# Patient Record
Sex: FEMALE | Race: WHITE | NOT HISPANIC OR LATINO | Employment: PART TIME | URBAN - METROPOLITAN AREA
[De-identification: names, ages, dates, MRNs, and addresses within clinical notes are randomized per-mention and may not be internally consistent; named-entity substitution may affect disease eponyms.]

---

## 2021-01-11 ENCOUNTER — TRANSCRIBE ORDERS (OUTPATIENT)
Dept: ADMINISTRATIVE | Facility: HOSPITAL | Age: 61
End: 2021-01-11

## 2021-01-25 ENCOUNTER — TRANSCRIBE ORDERS (OUTPATIENT)
Dept: ADMINISTRATIVE | Facility: HOSPITAL | Age: 61
End: 2021-01-25

## 2021-01-25 DIAGNOSIS — Z12.31 VISIT FOR SCREENING MAMMOGRAM: Primary | ICD-10-CM

## 2021-03-24 ENCOUNTER — APPOINTMENT (OUTPATIENT)
Dept: MAMMOGRAPHY | Facility: HOSPITAL | Age: 61
End: 2021-03-24

## 2021-05-26 ENCOUNTER — HOSPITAL ENCOUNTER (OUTPATIENT)
Dept: MAMMOGRAPHY | Facility: HOSPITAL | Age: 61
Discharge: HOME | End: 2021-05-26
Admitting: OBSTETRICS & GYNECOLOGY

## 2021-05-26 DIAGNOSIS — Z12.31 VISIT FOR SCREENING MAMMOGRAM: ICD-10-CM

## 2021-05-26 PROCEDURE — 77067 SCR MAMMO BI INCL CAD: CPT | Performed by: RADIOLOGY

## 2021-05-26 PROCEDURE — 77063 BREAST TOMOSYNTHESIS BI: CPT

## 2021-05-26 PROCEDURE — 77067 SCR MAMMO BI INCL CAD: CPT

## 2021-05-26 PROCEDURE — 77063 BREAST TOMOSYNTHESIS BI: CPT | Performed by: RADIOLOGY

## 2021-06-23 ENCOUNTER — TRANSCRIBE ORDERS (OUTPATIENT)
Dept: MAMMOGRAPHY | Facility: HOSPITAL | Age: 61
End: 2021-06-23

## 2021-06-23 ENCOUNTER — HOSPITAL ENCOUNTER (OUTPATIENT)
Dept: MAMMOGRAPHY | Facility: HOSPITAL | Age: 61
Discharge: HOME | End: 2021-06-23
Admitting: RADIOLOGY

## 2021-06-23 DIAGNOSIS — R92.8 ABNORMAL MAMMOGRAM: Primary | ICD-10-CM

## 2021-06-23 DIAGNOSIS — R92.8 ABNORMAL MAMMOGRAM: ICD-10-CM

## 2021-06-23 PROCEDURE — 77065 DX MAMMO INCL CAD UNI: CPT

## 2021-06-23 PROCEDURE — 77065 DX MAMMO INCL CAD UNI: CPT | Performed by: RADIOLOGY

## 2021-07-28 ENCOUNTER — HOSPITAL ENCOUNTER (OUTPATIENT)
Dept: MAMMOGRAPHY | Facility: HOSPITAL | Age: 61
Discharge: HOME | End: 2021-07-28

## 2021-07-28 DIAGNOSIS — R92.8 ABNORMAL MAMMOGRAM: ICD-10-CM

## 2021-07-28 PROCEDURE — 76098 X-RAY EXAM SURGICAL SPECIMEN: CPT

## 2021-07-28 PROCEDURE — A4648 IMPLANTABLE TISSUE MARKER: HCPCS

## 2021-07-28 PROCEDURE — 88305 TISSUE EXAM BY PATHOLOGIST: CPT | Performed by: OBSTETRICS & GYNECOLOGY

## 2021-07-28 PROCEDURE — 25010000003 LIDOCAINE 1 % SOLUTION: Performed by: RADIOLOGY

## 2021-07-28 PROCEDURE — 77065 DX MAMMO INCL CAD UNI: CPT | Performed by: RADIOLOGY

## 2021-07-28 PROCEDURE — 19081 BX BREAST 1ST LESION STRTCTC: CPT | Performed by: RADIOLOGY

## 2021-07-28 RX ORDER — LIDOCAINE HYDROCHLORIDE 10 MG/ML
5 INJECTION, SOLUTION INFILTRATION; PERINEURAL ONCE
Status: COMPLETED | OUTPATIENT
Start: 2021-07-28 | End: 2021-07-28

## 2021-07-28 RX ORDER — LIDOCAINE HYDROCHLORIDE AND EPINEPHRINE 10; 10 MG/ML; UG/ML
10 INJECTION, SOLUTION INFILTRATION; PERINEURAL ONCE
Status: COMPLETED | OUTPATIENT
Start: 2021-07-28 | End: 2021-07-28

## 2021-07-28 RX ADMIN — LIDOCAINE HYDROCHLORIDE 0.5 ML: 10 INJECTION, SOLUTION INFILTRATION; PERINEURAL at 11:29

## 2021-07-28 RX ADMIN — LIDOCAINE HYDROCHLORIDE,EPINEPHRINE BITARTRATE 5 ML: 10; .01 INJECTION, SOLUTION INFILTRATION; PERINEURAL at 11:34

## 2021-07-28 NOTE — PROGRESS NOTES
Alert and orientated. Denies discomfort., No active bleeding., Steri-strips not visualized, gauze dressing intact. Ice packs given. Verbalizes and demonstrates understanding of post-care instructions, written copy given.

## 2021-07-29 LAB
CYTO UR: NORMAL
LAB AP CASE REPORT: NORMAL
LAB AP CLINICAL INFORMATION: NORMAL
LAB AP DIAGNOSIS COMMENT: NORMAL
PATH REPORT.FINAL DX SPEC: NORMAL
PATH REPORT.GROSS SPEC: NORMAL

## 2022-01-11 ENCOUNTER — TRANSCRIBE ORDERS (OUTPATIENT)
Dept: MAMMOGRAPHY | Facility: HOSPITAL | Age: 62
End: 2022-01-11

## 2022-01-11 DIAGNOSIS — R92.8 ABNORMAL MAMMOGRAM: Primary | ICD-10-CM

## 2022-03-01 ENCOUNTER — TRANSCRIBE ORDERS (OUTPATIENT)
Dept: MAMMOGRAPHY | Facility: HOSPITAL | Age: 62
End: 2022-03-01

## 2022-03-01 ENCOUNTER — HOSPITAL ENCOUNTER (OUTPATIENT)
Dept: MAMMOGRAPHY | Facility: HOSPITAL | Age: 62
Discharge: HOME | End: 2022-03-01
Admitting: OBSTETRICS & GYNECOLOGY

## 2022-03-01 DIAGNOSIS — R92.8 ABNORMAL MAMMOGRAM: ICD-10-CM

## 2022-03-01 DIAGNOSIS — R92.8 ABNORMAL MAMMOGRAM: Primary | ICD-10-CM

## 2022-03-01 PROCEDURE — 77065 DX MAMMO INCL CAD UNI: CPT | Performed by: RADIOLOGY

## 2022-03-01 PROCEDURE — 77065 DX MAMMO INCL CAD UNI: CPT

## 2023-11-27 ENCOUNTER — OFFICE VISIT (OUTPATIENT)
Dept: ENDOCRINOLOGY | Facility: CLINIC | Age: 63
End: 2023-11-27
Payer: COMMERCIAL

## 2023-11-27 VITALS
HEIGHT: 67 IN | BODY MASS INDEX: 28.25 KG/M2 | DIASTOLIC BLOOD PRESSURE: 76 MMHG | HEART RATE: 54 BPM | WEIGHT: 180 LBS | OXYGEN SATURATION: 98 % | SYSTOLIC BLOOD PRESSURE: 130 MMHG

## 2023-11-27 DIAGNOSIS — E03.8 HYPOTHYROIDISM DUE TO HASHIMOTO'S THYROIDITIS: Primary | ICD-10-CM

## 2023-11-27 DIAGNOSIS — G47.00 INSOMNIA, UNSPECIFIED TYPE: ICD-10-CM

## 2023-11-27 DIAGNOSIS — E06.3 HYPOTHYROIDISM DUE TO HASHIMOTO'S THYROIDITIS: Primary | ICD-10-CM

## 2023-11-27 DIAGNOSIS — R00.1 BRADYCARDIA: ICD-10-CM

## 2023-11-27 LAB
T4 FREE SERPL-MCNC: 1.17 NG/DL (ref 0.93–1.7)
TSH SERPL DL<=0.05 MIU/L-ACNC: 2.14 UIU/ML (ref 0.27–4.2)

## 2023-11-27 PROCEDURE — 84443 ASSAY THYROID STIM HORMONE: CPT | Performed by: INTERNAL MEDICINE

## 2023-11-27 PROCEDURE — 84439 ASSAY OF FREE THYROXINE: CPT | Performed by: INTERNAL MEDICINE

## 2023-11-27 PROCEDURE — 99204 OFFICE O/P NEW MOD 45 MIN: CPT | Performed by: INTERNAL MEDICINE

## 2023-11-27 RX ORDER — LEVOTHYROXINE SODIUM 88 UG/1
88 TABLET ORAL DAILY
Qty: 30 TABLET | Refills: 5 | Status: SHIPPED | OUTPATIENT
Start: 2023-11-27

## 2023-11-27 NOTE — PATIENT INSTRUCTIONS
Try melatonin 3-5 mg 30 min to an hour before bed OR unisom 1/4-whole tab 30-60 minutes before bed.     If you have episodes of snoring, you may need to undergo a sleep evaluation.

## 2023-11-27 NOTE — PROGRESS NOTES
Chief Complaint   Patient presents with    Hypothyroidism        Referring Provider  Lorena Camp PA     HPI   Otiliaannie Cano is a 62 y.o. female had concerns including Hypothyroidism.     Hypothyroidism was diagnosed maybe around .     She feels fatigued, hair loss, dry skin. Other times she has insomnia and worries it could be thyroid related.     Is currently on levothyroxine 75 mcg daily. Takes daily as directed separate from food/drink/meds and no missed doses.   In the past has been on cytomel. Saw an endocrinologist in the past who wanted her off of it.     Has issues with bradycardia - was told maybe due to B12 deficiency.   At times as low as 33. Saw cardiology and had a heart monitor but didn't have an episode while wearing the monitor.     Sleep is poor - gets at most maybe 6 hrs at night. Sometimes less.   Falls asleep ok but wakes overnight, sometimes can't go back to sleep.   Hasn't tried anything OTC or sleep aids.      Past Medical History:   Diagnosis Date    H/O Hashimoto thyroiditis     Hx of echocardiogram 2015    ejection fraction of 60% to 65% with normal diastolic filling    Hyperlipidemia     Palpitations     Syncope     Neg tilt table study 3/2/16, echo 11/15 normal EF 60-65%     Past Surgical History:   Procedure Laterality Date    BREAST BIOPSY Left      SECTION      2    HYSTERECTOMY      OOPHORECTOMY        Family History   Problem Relation Age of Onset    Heart attack Other     Breast cancer Maternal Aunt       Social History     Socioeconomic History    Marital status:    Tobacco Use    Smoking status: Former     Types: Cigarettes     Quit date:      Years since quittin.9   Substance and Sexual Activity    Alcohol use: Yes     Alcohol/week: 1.0 standard drink of alcohol     Types: 1 Glasses of wine per week     Comment: occ    Sexual activity: Defer      Allergies   Allergen Reactions    Codeine Nausea And Vomiting    Rosuvastatin Myalgia  "     Current Outpatient Medications on File Prior to Visit   Medication Sig Dispense Refill    Cholecalciferol (VITAMIN D3) 2000 UNITS tablet Take  by mouth.      estradiol (MINIVELLE) 0.05 MG/24HR patch Place 1 patch on the skin as directed by provider 1 (One) Time Per Week.      levothyroxine (SYNTHROID, LEVOTHROID) 75 MCG tablet Take 1 tablet by mouth Daily.      [DISCONTINUED] aspirin 81 MG tablet Take 81 mg by mouth daily.      [DISCONTINUED] FOLIC ACID PO Take  by mouth.      [DISCONTINUED] liothyronine (CYTOMEL) 5 MCG tablet Take 5 mcg by mouth daily.      [DISCONTINUED] lisinopril-hydrochlorothiazide (PRINZIDE) 10-12.5 MG per tablet 0.5 tablet daily 30 tablet 2    [DISCONTINUED] lisinopril-hydrochlorothiazide (PRINZIDE,ZESTORETIC) 10-12.5 MG per tablet TAKE 1 TABLET BY ORAL ROUTE ONCE DAILY 30 tablet 0     No current facility-administered medications on file prior to visit.        Review of Systems   Constitutional:  Positive for fatigue.   HENT:  Positive for tinnitus.    Eyes: Negative.         Dryness   Respiratory: Negative.     Cardiovascular: Negative.    Gastrointestinal:  Positive for constipation.   Endocrine: Positive for cold intolerance and heat intolerance.        Hair loss   Genitourinary: Negative.    Musculoskeletal: Negative.    Skin: Negative.    Allergic/Immunologic: Negative.    Neurological: Negative.    Hematological: Negative.    Psychiatric/Behavioral:  Positive for sleep disturbance.         /76   Pulse 54   Ht 170.2 cm (67\")   Wt 81.6 kg (180 lb)   LMP  (LMP Unknown)   SpO2 98%   BMI 28.19 kg/m²      Physical Exam    Constitutional:  well developed; well nourished  no acute distress   ENT/Thyroid: no thyromegaly  no palpable nodules   Eyes: EOM intact  Conjunctiva: clear   Respiratory:  breathing is unlabored  clear to auscultation bilaterally   Cardiovascular:  regular rate and rhythm, S1, S2 normal, no murmur, click, rub or gallop   Chest:  Not performed.   Abdomen: " Not performed.   : Not performed.   Musculoskeletal: negative findings:  ROM of all joints is normal, no deformities present   Skin: dry and warm   Neuro: normal without focal findings and mental status, speech normal, alert and oriented x3   Psych: oriented to time, place and person, mood and affect are within normal limits     Labs/Imaging  CMP  Lab Results   Component Value Date    GLUCOSE 96 01/20/2016    BUN 14 01/20/2016    CREATININE 0.8 01/20/2016    EGFR 77 11/09/2015    K 4.6 01/20/2016    CO2 28 01/20/2016    CALCIUM 9.6 01/20/2016    CALCIUM 9.3 01/20/2016    ALBUMIN 5.0 (H) 11/09/2015    AST 24 11/09/2015    ALT 27 11/09/2015        CBC w/DIFF   Lab Results   Component Value Date    WBC 7.63 11/09/2015    RBC 4.77 11/09/2015    HGB 14.5 11/09/2015    HCT 42.9 11/09/2015    MCV 89.9 11/09/2015    MCH 30.4 11/09/2015    MCHC 33.8 11/09/2015     11/09/2015    NEUTRORELPCT 61.6 11/09/2015    LYMPHORELPCT 25.7 11/09/2015    MONORELPCT 10.2 11/09/2015    EOSRELPCT 1.7 11/09/2015    BASORELPCT 0.7 11/09/2015    NEUTROABS 4.70 11/09/2015    LYMPHSABS 1.96 11/09/2015    MONOSABS 0.78 11/09/2015    EOSABS 0.13 11/09/2015    BASOSABS 0.05 11/09/2015     TSH  Lab Results   Component Value Date    TSH 0.849 01/20/2016    TSH 4.017 11/09/2015     T4  Lab Results   Component Value Date    FREET4 0.94 01/20/2016     T3  Lab Results   Component Value Date    T3FREE 3.1 01/20/2016 6/21/2023 vitamin D 36, B12 214 with lower limit 232, folic acid 8.3, normal, free T3 2.22, normal, free T4 1.27, TSH 2.42, CBC within normal limits, CMP with glucose 104, creatinine 0.88, LFTs normal, total cholesterol 250, , triglycerides 124, HDL 69, TPO antibody 27, thyroglobulin 2      Assessment and Plan    Diagnoses and all orders for this visit:    1. Hypothyroidism due to Hashimoto's thyroiditis (Primary)  Diagnosed 2008.    With persistent symptoms.  On levothyroxine 75 mcg daily, takes as directed with no missed  doses.  TSH mid normal.  Has been on Cytomel in the past in addition to T4 but no significant change in symptoms.    Repeat TFTs today and titrate levothyroxine if able for TSH in the lower range of normal.  -     TSH  -     T4, Free    2. Insomnia, unspecified type  Uncontrolled, some snoring, consider possible evaluation for sleep apnea.  Recommended OTC melatonin and/or Unisom.  Follow-up with PCP if no change.    3. Bradycardia  Seems unrelated to the thyroid with levels in normal range, but will work to optimize TSH as above.  Follow-up with cardiology if symptoms persist or progress.       Return in about 5 months (around 4/27/2024) for next scheduled follow up. The patient was instructed to contact the clinic with any interval questions or concerns.    Daly Calle,    Endocrinologist    Please note that portions of this note were completed with a voice recognition program.

## 2024-04-06 DIAGNOSIS — E03.8 HYPOTHYROIDISM DUE TO HASHIMOTO'S THYROIDITIS: ICD-10-CM

## 2024-04-06 DIAGNOSIS — E06.3 HYPOTHYROIDISM DUE TO HASHIMOTO'S THYROIDITIS: ICD-10-CM

## 2024-04-09 NOTE — TELEPHONE ENCOUNTER
Rx Refill Note  Requested Prescriptions     Pending Prescriptions Disp Refills    levothyroxine (SYNTHROID, LEVOTHROID) 88 MCG tablet [Pharmacy Med Name: LEVOTHYROXINE 88 MCG TABLET] 90 tablet 1     Sig: TAKE 1 TABLET BY MOUTH EVERY DAY          Last office visit with prescribing clinician: 11/27/2023     Next office visit with prescribing clinician: 4/30/2024         Alessia Cody MA  04/09/24, 16:29 EDT

## 2024-04-10 RX ORDER — LEVOTHYROXINE SODIUM 88 UG/1
88 TABLET ORAL DAILY
Qty: 90 TABLET | Refills: 0 | Status: SHIPPED | OUTPATIENT
Start: 2024-04-10

## 2024-04-30 ENCOUNTER — OFFICE VISIT (OUTPATIENT)
Dept: ENDOCRINOLOGY | Facility: CLINIC | Age: 64
End: 2024-04-30
Payer: COMMERCIAL

## 2024-04-30 VITALS
HEART RATE: 56 BPM | WEIGHT: 176 LBS | OXYGEN SATURATION: 97 % | DIASTOLIC BLOOD PRESSURE: 68 MMHG | BODY MASS INDEX: 27.57 KG/M2 | SYSTOLIC BLOOD PRESSURE: 124 MMHG

## 2024-04-30 DIAGNOSIS — G93.32 POST-COVID CHRONIC FATIGUE: ICD-10-CM

## 2024-04-30 DIAGNOSIS — U09.9 POST-COVID CHRONIC FATIGUE: ICD-10-CM

## 2024-04-30 DIAGNOSIS — E04.2 MULTIPLE THYROID NODULES: ICD-10-CM

## 2024-04-30 DIAGNOSIS — E55.9 VITAMIN D DEFICIENCY: ICD-10-CM

## 2024-04-30 DIAGNOSIS — E06.3 HYPOTHYROIDISM DUE TO HASHIMOTO'S THYROIDITIS: Primary | ICD-10-CM

## 2024-04-30 DIAGNOSIS — R25.1 SHAKINESS: ICD-10-CM

## 2024-04-30 DIAGNOSIS — R00.1 BRADYCARDIA: ICD-10-CM

## 2024-04-30 DIAGNOSIS — R53.82 CHRONIC FATIGUE: ICD-10-CM

## 2024-04-30 DIAGNOSIS — E03.8 HYPOTHYROIDISM DUE TO HASHIMOTO'S THYROIDITIS: Primary | ICD-10-CM

## 2024-04-30 DIAGNOSIS — E53.8 B12 DEFICIENCY: ICD-10-CM

## 2024-04-30 PROBLEM — L43.9 LICHEN PLANUS: Status: ACTIVE | Noted: 2024-04-30

## 2024-04-30 LAB
25(OH)D3 SERPL-MCNC: 36.5 NG/ML (ref 30–100)
ALBUMIN SERPL-MCNC: 4.5 G/DL (ref 3.5–5.2)
ALBUMIN/GLOB SERPL: 1.6 G/DL
ALP SERPL-CCNC: 50 U/L (ref 39–117)
ALT SERPL W P-5'-P-CCNC: 17 U/L (ref 1–33)
ANION GAP SERPL CALCULATED.3IONS-SCNC: 11.6 MMOL/L (ref 5–15)
AST SERPL-CCNC: 17 U/L (ref 1–32)
BILIRUB SERPL-MCNC: 1.1 MG/DL (ref 0–1.2)
BUN SERPL-MCNC: 19 MG/DL (ref 8–23)
BUN/CREAT SERPL: 20.9 (ref 7–25)
CALCIUM SPEC-SCNC: 9.5 MG/DL (ref 8.6–10.5)
CHLORIDE SERPL-SCNC: 103 MMOL/L (ref 98–107)
CO2 SERPL-SCNC: 25.4 MMOL/L (ref 22–29)
CREAT SERPL-MCNC: 0.91 MG/DL (ref 0.57–1)
DEPRECATED RDW RBC AUTO: 41.5 FL (ref 37–54)
EGFRCR SERPLBLD CKD-EPI 2021: 70.6 ML/MIN/1.73
ERYTHROCYTE [DISTWIDTH] IN BLOOD BY AUTOMATED COUNT: 12.4 % (ref 12.3–15.4)
EXPIRATION DATE: NORMAL
GLOBULIN UR ELPH-MCNC: 2.9 GM/DL
GLUCOSE BLDC GLUCOMTR-MCNC: 117 MG/DL (ref 70–130)
GLUCOSE SERPL-MCNC: 96 MG/DL (ref 65–99)
HCT VFR BLD AUTO: 43.1 % (ref 34–46.6)
HGB BLD-MCNC: 14.4 G/DL (ref 12–15.9)
Lab: NORMAL
MCH RBC QN AUTO: 30.4 PG (ref 26.6–33)
MCHC RBC AUTO-ENTMCNC: 33.4 G/DL (ref 31.5–35.7)
MCV RBC AUTO: 91.1 FL (ref 79–97)
PLATELET # BLD AUTO: 220 10*3/MM3 (ref 140–450)
PMV BLD AUTO: 11.5 FL (ref 6–12)
POTASSIUM SERPL-SCNC: 4.2 MMOL/L (ref 3.5–5.2)
PROT SERPL-MCNC: 7.4 G/DL (ref 6–8.5)
RBC # BLD AUTO: 4.73 10*6/MM3 (ref 3.77–5.28)
SODIUM SERPL-SCNC: 140 MMOL/L (ref 136–145)
T4 FREE SERPL-MCNC: 1.32 NG/DL (ref 0.93–1.7)
TSH SERPL DL<=0.05 MIU/L-ACNC: 0.84 UIU/ML (ref 0.27–4.2)
VIT B12 BLD-MCNC: 845 PG/ML (ref 211–946)
WBC NRBC COR # BLD AUTO: 6.4 10*3/MM3 (ref 3.4–10.8)

## 2024-04-30 PROCEDURE — 80050 GENERAL HEALTH PANEL: CPT | Performed by: INTERNAL MEDICINE

## 2024-04-30 PROCEDURE — 84439 ASSAY OF FREE THYROXINE: CPT | Performed by: INTERNAL MEDICINE

## 2024-04-30 PROCEDURE — 99214 OFFICE O/P EST MOD 30 MIN: CPT | Performed by: INTERNAL MEDICINE

## 2024-04-30 PROCEDURE — 82306 VITAMIN D 25 HYDROXY: CPT | Performed by: INTERNAL MEDICINE

## 2024-04-30 PROCEDURE — 82607 VITAMIN B-12: CPT | Performed by: INTERNAL MEDICINE

## 2024-04-30 PROCEDURE — 82947 ASSAY GLUCOSE BLOOD QUANT: CPT | Performed by: INTERNAL MEDICINE

## 2024-04-30 RX ORDER — DEXAMETHASONE 0.5 MG/5ML
SOLUTION ORAL
COMMUNITY
Start: 2024-03-13

## 2024-04-30 RX ORDER — CYANOCOBALAMIN 1000 UG/ML
INJECTION, SOLUTION INTRAMUSCULAR; SUBCUTANEOUS
COMMUNITY
Start: 2024-04-07

## 2024-04-30 NOTE — PROGRESS NOTES
Chief Complaint   Patient presents with    Hypothyroidism due to Hashimoto's thyroiditis     4 month follow-up        HPI   Otilia Cano is a 63 y.o. female had concerns including Hypothyroidism due to Hashimoto's thyroiditis (4 month follow-up).      On levothyroxine 88 mcg daily.  Dose was increased in November for symptoms of hypothyroidism with TSH of 2.1.  Feeling a bit shaky this morning and was concerned it was blood glucose related.  Her blood glucose is normal at 117. Working on weight loss. Tends to feel more shaky when dieting and losing weight. Glucose is normal today.     Weight is down 4 lbs.     She had COVID in February and was having some tenderness in her neck and having to clear her throat. PCP ordered thyroid ultrasound was completed on 4/11/2024.  Heterogeneous gland with left lobe nodules, largest measuring 10 mm.  Recommended follow-up in 1 year.    Still with fatigue since COVID.   On B12 injections and Vit D 2000 daily.     The following portions of the patient's history were reviewed and updated as appropriate: allergies, current medications, past family history, past medical history, past social history, past surgical history, and problem list.    Review of Systems   Constitutional: Negative.    Endocrine:        Shakiness        /68 (BP Location: Left arm, Patient Position: Sitting, Cuff Size: Adult)   Pulse 56   Wt 79.8 kg (176 lb)   LMP  (LMP Unknown)   SpO2 97%   BMI 27.57 kg/m²      Physical Exam  Vitals reviewed.   Constitutional:       Appearance: Normal appearance.   Cardiovascular:      Rate and Rhythm: Normal rate.   Pulmonary:      Effort: Pulmonary effort is normal.   Neurological:      General: No focal deficit present.      Mental Status: She is alert. Mental status is at baseline.   Psychiatric:         Mood and Affect: Mood normal.         Behavior: Behavior normal.              LABS AND IMAGING   CMP  Lab Results   Component Value Date    GLUCOSE 96  01/20/2016    BUN 14 01/20/2016    CREATININE 0.8 01/20/2016    K 4.6 01/20/2016    CO2 28 01/20/2016    CALCIUM 9.6 01/20/2016    CALCIUM 9.3 01/20/2016    ALBUMIN 5.0 (H) 11/09/2015    AST 24 11/09/2015    ALT 27 11/09/2015      CBC w/DIFF   Lab Results   Component Value Date    WBC 7.63 11/09/2015    RBC 4.77 11/09/2015    HGB 14.5 11/09/2015    HCT 42.9 11/09/2015    MCV 89.9 11/09/2015    MCH 30.4 11/09/2015    MCHC 33.8 11/09/2015     11/09/2015    NEUTRORELPCT 61.6 11/09/2015    LYMPHORELPCT 25.7 11/09/2015    MONORELPCT 10.2 11/09/2015    EOSRELPCT 1.7 11/09/2015    BASORELPCT 0.7 11/09/2015    NEUTROABS 4.70 11/09/2015    LYMPHSABS 1.96 11/09/2015    MONOSABS 0.78 11/09/2015    EOSABS 0.13 11/09/2015    BASOSABS 0.05 11/09/2015     TSH  Lab Results   Component Value Date    TSH 2.140 11/27/2023    TSH 0.849 01/20/2016    TSH 4.017 11/09/2015     T4  Lab Results   Component Value Date    FREET4 1.17 11/27/2023    FREET4 0.94 01/20/2016     T3  Lab Results   Component Value Date    T3FREE 3.1 01/20/2016 6/21/2023 vitamin D 36, B12 214 with lower limit 232, folic acid 8.3, normal, free T3 2.22, normal, free T4 1.27, TSH 2.42, CBC within normal limits, CMP with glucose 104, creatinine 0.88, LFTs normal, total cholesterol 250, , triglycerides 124, HDL 69, TPO antibody 27, thyroglobulin 2    3/28/24 TSH 0.329, FT4 1.38    4/11/2024 thyroid ultrasound with right lobe 4.1 x 1.6 x 1.2 cm, left lobe 4.2 x 1.2 x 1.1 cm, gland is normal and heterogeneous in echotexture, complex left nodule measuring 10 x 7 x 10 mm, hypoechoic left nodule measuring 7 x 5 x 7 mm, hyperechoic left solid lesion measuring 6 x 4 x 5 mm, recommended 1 year follow-up      Assessment and Plan    Diagnoses and all orders for this visit:    1. Hypothyroidism due to Hashimoto's thyroiditis (Primary)  Levothyroxine was increased in November 2023 due to symptoms and TSH is now low normal. Is currently on 88 mcg daily. Maybe some  improvement on the higher dose.   Recheck today. Consider dose reduction pending lab results. TSH was mid normal on 75 mcg daily.   -     T4, Free  -     TSH    2. Shakiness  Glucose normal today. Check BP/HR and glucose during episodes of shakiness.   Check some postprandial glucose levels to ensure she isn't experiencing reactive hypoglycemia.   -     POC Glucose, Blood    3. Multiple thyroid nodules  US from 4/11/24 with left lobe nodules, max 10 mm. Repeat US in the office in a year.     4. Chronic fatigue  Labs today.   -     Comprehensive Metabolic Panel  -     CBC (No Diff)    5. Vitamin D deficiency  On vit D 2000 IU daily. Check level today due to fatigue.   -     Vitamin D 25 Hydroxy    6. B12 deficiency  On B12 injections now since 4 months ago.   -     Vitamin B12    7. Post-COVID chronic fatigue  Suspected. Labs as above.     8. Bradycardia  Sometimes dropping to the high 40s. Has episodes of fatigue, lightheadedness. Would like to see another Cardiologist. Referral placed.       Return in about 6 months (around 10/30/2024) for next scheduled follow up. The patient was instructed to contact the clinic with any interval questions or concerns.    Electronically signed by: Daly Calle DO   Endocrinologist    Please note that portions of this note were completed with a voice recognition program.

## 2024-04-30 NOTE — PATIENT INSTRUCTIONS
Check glucose 1-2 hours after a meal. If > 160, message/call the office.   Check glucose when shaky symptoms occur. If < 55, message me please. Also check BP and HR.

## 2024-05-01 DIAGNOSIS — E03.8 HYPOTHYROIDISM DUE TO HASHIMOTO'S THYROIDITIS: ICD-10-CM

## 2024-05-01 DIAGNOSIS — E06.3 HYPOTHYROIDISM DUE TO HASHIMOTO'S THYROIDITIS: ICD-10-CM

## 2024-05-01 RX ORDER — LEVOTHYROXINE SODIUM 88 UG/1
88 TABLET ORAL DAILY
Qty: 90 TABLET | Refills: 3 | Status: SHIPPED | OUTPATIENT
Start: 2024-05-01

## 2024-06-10 ENCOUNTER — OFFICE VISIT (OUTPATIENT)
Dept: CARDIOLOGY | Facility: CLINIC | Age: 64
End: 2024-06-10
Payer: COMMERCIAL

## 2024-06-10 VITALS
SYSTOLIC BLOOD PRESSURE: 150 MMHG | DIASTOLIC BLOOD PRESSURE: 78 MMHG | OXYGEN SATURATION: 98 % | HEART RATE: 61 BPM | HEIGHT: 67 IN | WEIGHT: 174.8 LBS | BODY MASS INDEX: 27.44 KG/M2

## 2024-06-10 DIAGNOSIS — G47.9 SLEEP DISTURBANCE: ICD-10-CM

## 2024-06-10 DIAGNOSIS — I20.9 ANGINA PECTORIS: Primary | ICD-10-CM

## 2024-06-10 DIAGNOSIS — R00.1 BRADYCARDIA, SINUS: ICD-10-CM

## 2024-06-10 DIAGNOSIS — R42 DIZZY: ICD-10-CM

## 2024-06-10 PROCEDURE — 93000 ELECTROCARDIOGRAM COMPLETE: CPT | Performed by: INTERNAL MEDICINE

## 2024-06-10 PROCEDURE — 99204 OFFICE O/P NEW MOD 45 MIN: CPT | Performed by: INTERNAL MEDICINE

## 2024-06-10 NOTE — PROGRESS NOTES
[facility]  [address], [address]  [place], [place], [address] [phone]  [email or web address]           OUTPATIENT CLINIC CONSULTATION NOTE    Patient care team:  Patient Care Team:  Lorena Camp PA as PCP - General (Family Medicine)  Daly Calle DO as Consulting Physician (Endocrinology)  Eduardo Sow MD as Consulting Physician (Cardiology)    Requesting Provider and Reason for consultation: The patient is being seen today at the request of Daly Calle DO for bradycardia.     Subjective:   Chief complaint:   Chief Complaint   Patient presents with    Slow Heart Rate     Upon sitting symptoms worsen. Lowest HR recorded was 34bpm acc to pt.    Dizziness    Headache    Fatigue    Edema     Left leg         Otilia Cano is a 63 y.o. female.  Cardiac focused problem list:  Bradycardia   Stress test, 11/2015 without ischemia.   Tilt table test, 3/16/2016:  Negative tilt table test.   Prominent symptoms following COVID 2/2024, fatigue, dizziness, slower heart rate  PVCs, palpitations  30 day heart monitor, 6/16/2016:  No significant arrhythmias.   Hyperlipidemia   Hypothyroidism     HPI:    Patient presents today in consultation for bradycardia.     Since having COVID in 2/2024, she has noticed increased fatigue, dizziness, slower heart rate.  Watch sometimes suggest heart rate in the 30s.  Average heart rate on her watch is a 49.  This is in contrast to her average heart rate in the 60s on a E patch she wore recently.  Occasional chest discomfort, intermittent, pressure-like.  Does not smoke.  Mother had a pacemaker for bradycardia.  Has not been resting well.  Longstanding insomnia.  Morning headaches.  Blood pressure slightly higher than average, but overall has been in the 130s over 80s at home.      Review of Systems:  As noted above in the HPI    PFSH:  Patient Active Problem List   Diagnosis    Lymphocytic thyroiditis     Hypothyroidism    Hyperlipidemia    Palpitations    Lichen planus         Current Outpatient Medications:     cyanocobalamin 1000 MCG/ML injection, INJECT 1 ML SUBCUTANEOUSLY EVERY MONTH, Disp: , Rfl:     dexAMETHasone 0.5 MG/5ML solution, RINSE AND HOLD 5 MLS FOR 2 MINUTES THEN SPIT FOUR TIMES A DAY AS NEEDED, Disp: , Rfl:     estradiol (MINIVELLE) 0.05 MG/24HR patch, Place 1 patch on the skin as directed by provider Every 14 (Fourteen) Days., Disp: , Rfl:     levothyroxine (SYNTHROID, LEVOTHROID) 88 MCG tablet, Take 1 tablet by mouth Daily., Disp: 90 tablet, Rfl: 3    Cholecalciferol (VITAMIN D3) 2000 UNITS tablet, Take  by mouth. (Patient not taking: Reported on 6/10/2024), Disp: , Rfl:     Allergies   Allergen Reactions    Codeine Nausea And Vomiting    Rosuvastatin Myalgia    Statins Myalgia       Social History     Socioeconomic History    Marital status:    Tobacco Use    Smoking status: Former     Current packs/day: 0.00     Types: Cigarettes     Quit date:      Years since quittin.4     Passive exposure: Past    Tobacco comments:     I smoked socially when going out in my early twenties for a short time, about a year sporadically   Vaping Use    Vaping status: Never Used   Substance and Sexual Activity    Alcohol use: Yes     Alcohol/week: 2.0 standard drinks of alcohol     Types: 1 Glasses of wine, 1 Drinks containing 0.5 oz of alcohol per week     Comment: Not every week. Maybe every other week and on vacation    Drug use: Never    Sexual activity: Yes     Partners: Male     Birth control/protection: Hysterectomy     Comment: I am . We are monogamous with each other     Family History   Problem Relation Age of Onset    Hypertension Mother     Obesity Mother     Thyroid disease Mother     Heart attack Mother     Bone cancer Father     Breast cancer Maternal Aunt     Heart attack Other          Objective:   Physical Exam:  /78 (BP Location: Left arm, Patient Position: Sitting)   " Pulse 61   Ht 170.2 cm (67\")   Wt 79.3 kg (174 lb 12.8 oz)   LMP  (LMP Unknown)   SpO2 98%   BMI 27.38 kg/m²   CONSTITUTIONAL: No acute distress  RESPIRATORY: Normal effort. Clear to auscultation bilaterally without wheezing or rales  CARDIOVASCULAR: Regular rate and rhythm with normal S1 and S2. Without murmur.  PERIPHERAL VASCULAR: No carotid bruit bilaterally.  Normal radial pulse.     Labs:  Labs reviewed by myself    No results found for: \"CHOL\"  No results found for: \"TRIG\"  No results found for: \"HDL\"  No results found for: \"LDL\"  No components found for: \"LDLDIRECTC\"    Diagnostic Data:      ECG 12 Lead    Date/Time: 6/10/2024 11:33 AM  Performed by: Eduardo Sow MD    Authorized by: Eduardo Sow MD  Comparison: compared with previous ECG from 5/1/2016  Similar to previous ECG  Comparison to previous ECG:  nonspecific T wave abnormality.  PVC present.  Rhythm: sinus rhythm              Assessment and Plan:     Angina pectoris  -Ongoing chest discomfort, somewhat abnormal EKG, unexplained recent sinus bradycardia  -Recommend exercise nuclear stress test.  Recommend exercise portion also to rule out chronotropic incompetence    Bradycardia, sinus  Dizzy  Long COVID, post-COVID syndrome  Sleep disturbance   Hypothyroidism  -Symptoms may be mostly attributable to long COVID, but patient has now had symptoms for several months  -Repeat heart monitor to reassess bradycardia.  Will have her wear it for a longer period of time to see if we can capture one of her more severe episodes and correlated clinically.  Heart monitor from 5/2024 had an average heart rate 69, range  bpm, with slower heart rates in the evenings and acceptable heart rates during the day.  -Recommend echo to rule out structural heart disease  -Has ongoing sleep disturbances, worse since COVID.  Recommend sleep medicine referral  -Thyroid function has been stable    - Return in about 6 months (around 12/10/2024) for Next scheduled " follow up, or sooner if needed.

## 2024-07-01 ENCOUNTER — TELEPHONE (OUTPATIENT)
Dept: CARDIOLOGY | Facility: CLINIC | Age: 64
End: 2024-07-01
Payer: COMMERCIAL

## 2024-07-01 NOTE — TELEPHONE ENCOUNTER
----- Message from Eduardo Sow sent at 7/1/2024 12:59 PM EDT -----  Heart monitor without significant arrhythmia.  Will follow-up again after further cardiac testing scheduled in a couple weeks.  If with severe palpitations that are lifestyle limiting, trial of Toprol 25 mg daily.  ----- Message -----  From: Eduardo Sow MD  Sent: 7/1/2024   8:00 AM EDT  To: Eduardo Sow MD   Yes

## 2024-07-17 ENCOUNTER — HOSPITAL ENCOUNTER (OUTPATIENT)
Dept: CARDIOLOGY | Facility: HOSPITAL | Age: 64
Discharge: HOME | End: 2024-07-17
Payer: COMMERCIAL

## 2024-07-17 VITALS
HEART RATE: 48 BPM | SYSTOLIC BLOOD PRESSURE: 138 MMHG | OXYGEN SATURATION: 100 % | WEIGHT: 174 LBS | BODY MASS INDEX: 27.31 KG/M2 | HEIGHT: 67 IN | DIASTOLIC BLOOD PRESSURE: 88 MMHG

## 2024-07-17 DIAGNOSIS — I20.9 ANGINA PECTORIS: ICD-10-CM

## 2024-07-17 DIAGNOSIS — R42 DIZZY: ICD-10-CM

## 2024-07-17 DIAGNOSIS — R00.1 BRADYCARDIA, SINUS: ICD-10-CM

## 2024-07-17 LAB
BH CV ECHO MEAS - AO MAX PG: 6.5 MMHG
BH CV ECHO MEAS - AO ROOT DIAM: 2.6 CM
BH CV ECHO MEAS - AO V2 MAX: 127.3 CM/SEC
BH CV ECHO MEAS - IVS/LVPW: 1.11 CM
BH CV ECHO MEAS - IVSD: 1 CM
BH CV ECHO MEAS - LA DIMENSION: 4.2 CM
BH CV ECHO MEAS - LAT PEAK E' VEL: 7.5 CM/SEC
BH CV ECHO MEAS - LV MAX PG: 2.7 MMHG
BH CV ECHO MEAS - LV MEAN PG: 1.5 MMHG
BH CV ECHO MEAS - LV V1 MAX: 82.8 CM/SEC
BH CV ECHO MEAS - LV V1 VTI: 19.1 CM
BH CV ECHO MEAS - LVIDD: 4.7 CM
BH CV ECHO MEAS - LVIDS: 3.6 CM
BH CV ECHO MEAS - LVOT DIAM: 2 CM
BH CV ECHO MEAS - LVPWD: 0.9 CM
BH CV ECHO MEAS - MED PEAK E' VEL: 6.8 CM/SEC
BH CV ECHO MEAS - MV A MAX VEL: 73.3 CM/SEC
BH CV ECHO MEAS - MV DEC SLOPE: 268 CM/SEC2
BH CV ECHO MEAS - MV DEC TIME: 206 SEC
BH CV ECHO MEAS - MV E MAX VEL: 65.5 CM/SEC
BH CV ECHO MEAS - MV E/A: 0.89
BH CV ECHO MEAS - MV MAX PG: 3 MMHG
BH CV ECHO MEAS - MV MEAN PG: 0.7 MMHG
BH CV ECHO MEAS - MV P1/2T: 98 MSEC
BH CV ECHO MEAS - MV V2 VTI: 33.1 CM
BH CV ECHO MEAS - PA ACC TIME: 0.13 SEC
BH CV ECHO MEAS - PI END-D VEL: 79.6 CM/SEC
BH CV ECHO MEAS - RAP SYSTOLE: 8 MMHG
BH CV ECHO MEAS - RVSP: 28.8 MMHG
BH CV ECHO MEAS - TAPSE (>1.6): 1.88 CM
BH CV ECHO MEAS - TR MAX PG: 20.8 MMHG
BH CV ECHO MEAS - TR MAX VEL: 228.1 CM/SEC
BH CV ECHO MEASUREMENTS AVERAGE E/E' RATIO: 9.16
BH CV REST NUCLEAR ISOTOPE DOSE: 9.7 MCI
BH CV STRESS BP STAGE 1: NORMAL
BH CV STRESS BP STAGE 2: NORMAL
BH CV STRESS DURATION MIN STAGE 1: 3
BH CV STRESS DURATION MIN STAGE 2: 3
BH CV STRESS DURATION MIN STAGE 3: 2
BH CV STRESS DURATION SEC STAGE 1: 0
BH CV STRESS DURATION SEC STAGE 2: 0
BH CV STRESS DURATION SEC STAGE 3: 8
BH CV STRESS GRADE STAGE 1: 10
BH CV STRESS GRADE STAGE 2: 12
BH CV STRESS GRADE STAGE 3: 14
BH CV STRESS HR STAGE 1: 113
BH CV STRESS HR STAGE 2: 144
BH CV STRESS HR STAGE 3: 155
BH CV STRESS METS STAGE 1: 5
BH CV STRESS METS STAGE 2: 7.5
BH CV STRESS METS STAGE 3: 10
BH CV STRESS NUCLEAR ISOTOPE DOSE: 31.7 MCI
BH CV STRESS O2 STAGE 1: 98
BH CV STRESS O2 STAGE 2: 100
BH CV STRESS O2 STAGE 3: 100
BH CV STRESS PROTOCOL 1: NORMAL
BH CV STRESS RECOVERY BP: NORMAL MMHG
BH CV STRESS RECOVERY HR: 69 BPM
BH CV STRESS RECOVERY O2: 100 %
BH CV STRESS SPEED STAGE 1: 1.7
BH CV STRESS SPEED STAGE 2: 2.5
BH CV STRESS SPEED STAGE 3: 3.4
BH CV STRESS STAGE 1: 1
BH CV STRESS STAGE 2: 2
BH CV STRESS STAGE 3: 3
BH CV XLRA - RV BASE: 3.5 CM
BH CV XLRA - RV LENGTH: 6.58 CM
BH CV XLRA - RV MID: 3 CM
BH CV XLRA - TDI S': 5.8 CM/SEC
LV EF NUC BP: 77 %
MAXIMAL PREDICTED HEART RATE: 156 BPM
MV VALVE AREA BY PLANIMETRY: 2.3 CM2
PERCENT MAX PREDICTED HR: 99.36 %
STRESS BASELINE BP: NORMAL MMHG
STRESS BASELINE HR: 45 BPM
STRESS O2 SAT REST: 100 %
STRESS PERCENT HR: 117 %
STRESS POST ESTIMATED WORKLOAD: 10.1 METS
STRESS POST EXERCISE DUR MIN: 8 MIN
STRESS POST EXERCISE DUR SEC: 8 SEC
STRESS POST O2 SAT PEAK: 100 %
STRESS POST PEAK BP: NORMAL MMHG
STRESS POST PEAK HR: 155 BPM
STRESS TARGET HR: 133 BPM

## 2024-07-17 PROCEDURE — 0 TECHNETIUM SESTAMIBI: Performed by: INTERNAL MEDICINE

## 2024-07-17 PROCEDURE — A9500 TC99M SESTAMIBI: HCPCS | Performed by: INTERNAL MEDICINE

## 2024-07-17 PROCEDURE — 93017 CV STRESS TEST TRACING ONLY: CPT

## 2024-07-17 PROCEDURE — 93306 TTE W/DOPPLER COMPLETE: CPT

## 2024-07-17 PROCEDURE — 78452 HT MUSCLE IMAGE SPECT MULT: CPT

## 2024-07-17 RX ADMIN — TECHNETIUM TC 99M SESTAMIBI 1 DOSE: 1 INJECTION INTRAVENOUS at 10:41

## 2024-07-17 RX ADMIN — TECHNETIUM TC 99M SESTAMIBI 1 DOSE: 1 INJECTION INTRAVENOUS at 13:00

## 2024-07-22 ENCOUNTER — TELEPHONE (OUTPATIENT)
Dept: CARDIOLOGY | Facility: CLINIC | Age: 64
End: 2024-07-22
Payer: COMMERCIAL

## 2024-07-22 NOTE — TELEPHONE ENCOUNTER
This documentation is back chart documentation from 7/19/2024 @ 4224.     Dr. Sow asked me to call patient and report to them her echo and stress test looked okay. Pt notified that she did have some stiffening of her heart arteries. I told her Dr. Sow recommended heart healthy diet, exercise, and continued monitoring of risk factors. Pt stated she was trying to eat right and exercise. Pt stated she had a resting HR of 45 at cardiac rehab. Pt does not want Toprol XL at this time for any fluttering. I agreed. I told patient I would gather labs from PCP. Patient agreeable. No further questions at this time.     Labs were requested from PCP.

## 2024-11-05 ENCOUNTER — OFFICE VISIT (OUTPATIENT)
Dept: ENDOCRINOLOGY | Facility: CLINIC | Age: 64
End: 2024-11-05
Payer: COMMERCIAL

## 2024-11-05 VITALS
WEIGHT: 183 LBS | SYSTOLIC BLOOD PRESSURE: 122 MMHG | BODY MASS INDEX: 28.72 KG/M2 | HEART RATE: 82 BPM | DIASTOLIC BLOOD PRESSURE: 74 MMHG | OXYGEN SATURATION: 99 % | HEIGHT: 67 IN

## 2024-11-05 DIAGNOSIS — E66.3 OVERWEIGHT (BMI 25.0-29.9): ICD-10-CM

## 2024-11-05 DIAGNOSIS — E04.2 MULTIPLE THYROID NODULES: ICD-10-CM

## 2024-11-05 DIAGNOSIS — R00.1 BRADYCARDIA: ICD-10-CM

## 2024-11-05 DIAGNOSIS — E06.3 HYPOTHYROIDISM DUE TO HASHIMOTO'S THYROIDITIS: Primary | ICD-10-CM

## 2024-11-05 NOTE — PROGRESS NOTES
"Chief Complaint   Patient presents with    Hypothyroidism        HPI   Otilia Cano is a 63 y.o. female had concerns including Hypothyroidism.      Her resting heart rate is in the 40s at times. Has seen Dr. Sow and all workup was ok.   Her mother had bradycardia, hypothyroidism and developed dementia.   She is concerned about the risk for dementia.     Dizziness occurs at times during change of position.     Is otherwise feeling ok. Is prescribed levothyroxine 88 mcg daily. She was in [place] and forgot the med so has been taking the 75 mcg dose for the last 3-4 weeks. Thinks her palpitations have been a little worse.   Is back on the 88 mcg dose now for the last few days.     Wants to lose weight. Is confused on what approach to take. Gained about 10 lbs over the summer since she was less active after breaking her toe.   Has lost 5 lbs since resuming [employer] program.       The following portions of the patient's history were reviewed and updated as appropriate: allergies, current medications, past family history, past medical history, past social history, past surgical history, and problem list.    Review of Systems   Neurological:  Positive for dizziness.        /74   Pulse 82   Ht 170.2 cm (67\")   Wt 83 kg (183 lb)   LMP  (LMP Unknown)   SpO2 99%   BMI 28.66 kg/m²      Physical Exam  Vitals reviewed.   Constitutional:       Appearance: Normal appearance.   Cardiovascular:      Rate and Rhythm: Normal rate.   Pulmonary:      Effort: Pulmonary effort is normal.   Neurological:      General: No focal deficit present.      Mental Status: She is alert. Mental status is at baseline.   Psychiatric:         Mood and Affect: Mood normal.         Behavior: Behavior normal.              LABS AND IMAGING   CMP  Lab Results   Component Value Date    GLUCOSE 96 04/30/2024    BUN 19 04/30/2024    CREATININE 0.91 04/30/2024    BCR 20.9 04/30/2024    K 4.2 04/30/2024    CO2 25.4 04/30/2024    CALCIUM 9.5 " 04/30/2024    ALBUMIN 4.5 04/30/2024    AST 17 04/30/2024    ALT 17 04/30/2024        CBC w/DIFF   Lab Results   Component Value Date    WBC 6.40 04/30/2024    RBC 4.73 04/30/2024    HGB 14.4 04/30/2024    HCT 43.1 04/30/2024    MCV 91.1 04/30/2024    MCH 30.4 04/30/2024    MCHC 33.4 04/30/2024    RDW 12.4 04/30/2024    RDWSD 41.5 04/30/2024    MPV 11.5 04/30/2024     04/30/2024    NEUTRORELPCT 61.6 11/09/2015    LYMPHORELPCT 25.7 11/09/2015    MONORELPCT 10.2 11/09/2015    EOSRELPCT 1.7 11/09/2015    BASORELPCT 0.7 11/09/2015    NEUTROABS 4.70 11/09/2015    LYMPHSABS 1.96 11/09/2015    MONOSABS 0.78 11/09/2015    EOSABS 0.13 11/09/2015    BASOSABS 0.05 11/09/2015     TSH  Lab Results   Component Value Date    TSH 0.840 04/30/2024    TSH 2.140 11/27/2023    TSH 0.849 01/20/2016     T4  Lab Results   Component Value Date    FREET4 1.32 04/30/2024    FREET4 1.17 11/27/2023    FREET4 0.94 01/20/2016     T3  Lab Results   Component Value Date    T3FREE 3.1 01/20/2016     Lab Results   Component Value Date    CAIT27LO 36.5 04/30/2024    FXXC67QQ 15.5 01/20/2016 6/21/2023 vitamin D 36, B12 214 with lower limit 232, folic acid 8.3, normal, free T3 2.22, normal, free T4 1.27, TSH 2.42, CBC within normal limits, CMP with glucose 104, creatinine 0.88, LFTs normal, total cholesterol 250, , triglycerides 124, HDL 69, TPO antibody 27, thyroglobulin 2     3/28/24 TSH 0.329, FT4 1.38     4/11/2024 thyroid ultrasound with right lobe 4.1 x 1.6 x 1.2 cm, left lobe 4.2 x 1.2 x 1.1 cm, gland is normal and heterogeneous in echotexture, complex left nodule measuring 10 x 7 x 10 mm, hypoechoic left nodule measuring 7 x 5 x 7 mm, hyperechoic left solid lesion measuring 6 x 4 x 5 mm, recommended 1 year follow-up       Assessment and Plan    Diagnoses and all orders for this visit:    1. Hypothyroidism due to Hashimoto's thyroiditis (Primary)  Diagnosed 2008. Euthyroid. Has been on 75 mcg for the last few weeks due to  prescription issue but generally felt better on 88 mcg.   Recheck labs in 6-8 weeks.   -     T4, Free; Future  -     TSH; Future    2. Bradycardia  At times is symptomatic. Follow-up with Dr. Sow.    3. Multiple thyroid nodules  Ultrasound 4/11/2024 with left lobe nodules, largest 1 cm.  Repeat ultrasound at follow-up visit.    4. Overweight (BMI 25.0-29.9)  BMI up to 28.7 with a 10 pound weight gain over the summer due to decrease in activity after breaking her toe.  She has resumed a weight loss program and increased exercise and has already lost 5 pounds.  Congratulated on efforts.  Encouraged continued tracking, following a lower carb, higher protein, fresh food diet and maintaining caloric deficit will result in continued weight loss.       Return in about 6 months (around 5/5/2025) for next scheduled follow up, with thyroid ultrasound ** 30 min appt. The patient was instructed to contact the clinic with any interval questions or concerns.    Electronically signed by: Daly Calle DO   Endocrinologist    Please note that portions of this note were completed with a voice recognition program.

## 2025-01-02 ENCOUNTER — TELEPHONE (OUTPATIENT)
Dept: CARDIOLOGY | Facility: CLINIC | Age: 65
End: 2025-01-02

## 2025-01-02 NOTE — TELEPHONE ENCOUNTER
PT called stating her BP has been elevated recently.  PT did not have any readings to give, she states she has a ringing in her ear and that is usually a sign that her BP is elevated.  She had an appt today that she missed.  States she was taking down [place] decos and lost track of time.  Pt believes her elevated BP is due to the Holidays, diet, and salt intake.  PT will monitor BP for the next week and will call back with those readings or sooner if needed.      PT was agreeable and verbalized understanding

## 2025-01-02 NOTE — TELEPHONE ENCOUNTER
Caller: Otilia Cano    Relationship to patient: Self    Best call back number: [phone]     Chief complaint: ELEVATED BLOOD PRESSURE    Type of visit: F/U APPT    Requested date: NEXT AVAILABLE          Additional notes:PLEASE CONTACT PATIENT WITH A SUITABLE DATE

## 2025-02-26 ENCOUNTER — TELEPHONE (OUTPATIENT)
Dept: CARDIOLOGY | Facility: CLINIC | Age: 65
End: 2025-02-26
Payer: COMMERCIAL

## 2025-02-26 NOTE — TELEPHONE ENCOUNTER
Caller: Otilia Cano    Relationship to patient: Self    Best call back number: [phone]     Chief complaint: CHEST PAIN     Type of visit: F/U APPT    Requested date: NEXT AVAILABLE     Additional notes:PATIENT WAS SEEN IN THE E.D. ON 2-25-25 AT THE [facility]. PATIENT ALSO COMPLETED A STRESS TEST ON 2-26-25. PLEASE CONTACT PATIENT IN REGARDS TO A SUITABLE DATE.

## 2025-02-26 NOTE — TELEPHONE ENCOUNTER
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 Pt started feeling pressure, and she started getting hot and sweaty. This first instance was 2/25/25. Pt had stress test done at [facility] on 2/26/25. Pt reports they started her on aspirin, a statin, and gave her some nitroglycerin.     Pt gave me [phone] as the contact number. ED records requested. Will request stress test once they have had time to review it.

## 2025-02-28 RX ORDER — LISINOPRIL 5 MG/1
5 TABLET ORAL DAILY
COMMUNITY

## 2025-02-28 NOTE — TELEPHONE ENCOUNTER
With patient's recent hospitalization, they started her on Lisinopril 5 mg daily. Pt reports recent blood pressures: 141/90, 143/90. Pt continues to feel pressure in chest.     Called office this morning to get stress test report.

## 2025-03-07 PROBLEM — R07.2 PRECORDIAL CHEST PAIN: Status: ACTIVE | Noted: 2025-02-26

## 2025-03-10 ENCOUNTER — PREP FOR SURGERY (OUTPATIENT)
Dept: OTHER | Facility: HOSPITAL | Age: 65
End: 2025-03-10
Payer: COMMERCIAL

## 2025-03-10 DIAGNOSIS — R07.2 PRECORDIAL CHEST PAIN: Primary | ICD-10-CM

## 2025-03-10 RX ORDER — SODIUM CHLORIDE 9 MG/ML
40 INJECTION, SOLUTION INTRAVENOUS AS NEEDED
Status: CANCELLED | OUTPATIENT
Start: 2025-03-10

## 2025-03-10 RX ORDER — SODIUM CHLORIDE 0.9 % (FLUSH) 0.9 %
10 SYRINGE (ML) INJECTION EVERY 12 HOURS SCHEDULED
Status: CANCELLED | OUTPATIENT
Start: 2025-03-10

## 2025-03-10 RX ORDER — ASPIRIN 81 MG/1
324 TABLET, CHEWABLE ORAL ONCE
Status: CANCELLED | OUTPATIENT
Start: 2025-03-10 | End: 2025-03-10

## 2025-03-10 RX ORDER — SODIUM CHLORIDE 0.9 % (FLUSH) 0.9 %
10 SYRINGE (ML) INJECTION AS NEEDED
Status: CANCELLED | OUTPATIENT
Start: 2025-03-10

## 2025-03-10 RX ORDER — ASPIRIN 81 MG/1
81 TABLET ORAL DAILY
Status: CANCELLED | OUTPATIENT
Start: 2025-03-11

## 2025-03-14 ENCOUNTER — HOSPITAL ENCOUNTER (EMERGENCY)
Facility: HOSPITAL | Age: 65
Discharge: HOME | End: 2025-03-14
Attending: EMERGENCY MEDICINE
Payer: COMMERCIAL

## 2025-03-14 ENCOUNTER — HOSPITAL ENCOUNTER (OUTPATIENT)
Facility: HOSPITAL | Age: 65
Setting detail: HOSPITAL OUTPATIENT SURGERY
Discharge: HOME | End: 2025-03-14
Attending: INTERNAL MEDICINE | Admitting: INTERNAL MEDICINE
Payer: COMMERCIAL

## 2025-03-14 ENCOUNTER — TRANSCRIBE ORDERS (OUTPATIENT)
Dept: CARDIAC REHAB | Facility: HOSPITAL | Age: 65
End: 2025-03-14
Payer: COMMERCIAL

## 2025-03-14 ENCOUNTER — APPOINTMENT (OUTPATIENT)
Dept: CT IMAGING | Facility: HOSPITAL | Age: 65
End: 2025-03-14
Payer: COMMERCIAL

## 2025-03-14 VITALS
RESPIRATION RATE: 16 BRPM | BODY MASS INDEX: 28.12 KG/M2 | WEIGHT: 179.2 LBS | DIASTOLIC BLOOD PRESSURE: 67 MMHG | TEMPERATURE: 97.2 F | SYSTOLIC BLOOD PRESSURE: 128 MMHG | HEIGHT: 67 IN | HEART RATE: 63 BPM | OXYGEN SATURATION: 98 %

## 2025-03-14 VITALS
DIASTOLIC BLOOD PRESSURE: 70 MMHG | OXYGEN SATURATION: 96 % | HEART RATE: 80 BPM | HEIGHT: 67 IN | RESPIRATION RATE: 16 BRPM | TEMPERATURE: 97.7 F | BODY MASS INDEX: 27.78 KG/M2 | WEIGHT: 177 LBS | SYSTOLIC BLOOD PRESSURE: 133 MMHG

## 2025-03-14 DIAGNOSIS — Z95.5 STENTED CORONARY ARTERY: Primary | ICD-10-CM

## 2025-03-14 DIAGNOSIS — Z98.890 H/O CARDIAC CATHETERIZATION: ICD-10-CM

## 2025-03-14 DIAGNOSIS — R07.2 PRECORDIAL CHEST PAIN: ICD-10-CM

## 2025-03-14 DIAGNOSIS — I25.708 CORONARY ARTERY DISEASE OF BYPASS GRAFT OF NATIVE HEART WITH STABLE ANGINA PECTORIS: Primary | ICD-10-CM

## 2025-03-14 DIAGNOSIS — R51.9 LEFT-SIDED HEADACHE: Primary | ICD-10-CM

## 2025-03-14 LAB
ALBUMIN SERPL-MCNC: 4.1 G/DL (ref 3.5–5.2)
ALBUMIN SERPL-MCNC: 4.4 G/DL (ref 3.5–5.2)
ALBUMIN/GLOB SERPL: 1.5 G/DL
ALP SERPL-CCNC: 44 U/L (ref 39–117)
ALP SERPL-CCNC: 44 U/L (ref 39–117)
ALT SERPL W P-5'-P-CCNC: 12 U/L (ref 1–33)
ALT SERPL W P-5'-P-CCNC: 13 U/L (ref 1–33)
ANION GAP SERPL CALCULATED.3IONS-SCNC: 12 MMOL/L (ref 5–15)
ANION GAP SERPL CALCULATED.3IONS-SCNC: 14 MMOL/L (ref 5–15)
AST SERPL-CCNC: 17 U/L (ref 1–32)
AST SERPL-CCNC: 18 U/L (ref 1–32)
BASOPHILS # BLD AUTO: 0.08 10*3/MM3 (ref 0–0.2)
BASOPHILS NFR BLD AUTO: 0.6 % (ref 0–1.5)
BILIRUB CONJ SERPL-MCNC: 0.2 MG/DL (ref 0–0.3)
BILIRUB INDIRECT SERPL-MCNC: 0.5 MG/DL
BILIRUB SERPL-MCNC: 0.7 MG/DL (ref 0–1.2)
BILIRUB SERPL-MCNC: 0.7 MG/DL (ref 0–1.2)
BUN SERPL-MCNC: 14 MG/DL (ref 8–23)
BUN SERPL-MCNC: 20 MG/DL (ref 8–23)
BUN/CREAT SERPL: 18.2 (ref 7–25)
BUN/CREAT SERPL: 23 (ref 7–25)
CALCIUM SPEC-SCNC: 8.9 MG/DL (ref 8.6–10.5)
CALCIUM SPEC-SCNC: 9 MG/DL (ref 8.6–10.5)
CHLORIDE SERPL-SCNC: 103 MMOL/L (ref 98–107)
CHLORIDE SERPL-SCNC: 104 MMOL/L (ref 98–107)
CHOLEST SERPL-MCNC: 140 MG/DL (ref 0–200)
CO2 SERPL-SCNC: 22 MMOL/L (ref 22–29)
CO2 SERPL-SCNC: 25 MMOL/L (ref 22–29)
CREAT BLDA-MCNC: 1 MG/DL (ref 0.6–1.3)
CREAT SERPL-MCNC: 0.77 MG/DL (ref 0.57–1)
CREAT SERPL-MCNC: 0.87 MG/DL (ref 0.57–1)
DEPRECATED RDW RBC AUTO: 38.9 FL (ref 37–54)
DEPRECATED RDW RBC AUTO: 40.5 FL (ref 37–54)
EGFRCR SERPLBLD CKD-EPI 2021: 74 ML/MIN/1.73
EGFRCR SERPLBLD CKD-EPI 2021: 85.7 ML/MIN/1.73
EOSINOPHIL # BLD AUTO: 0.38 10*3/MM3 (ref 0–0.4)
EOSINOPHIL NFR BLD AUTO: 3 % (ref 0.3–6.2)
ERYTHROCYTE [DISTWIDTH] IN BLOOD BY AUTOMATED COUNT: 11.8 % (ref 12.3–15.4)
ERYTHROCYTE [DISTWIDTH] IN BLOOD BY AUTOMATED COUNT: 12.6 % (ref 12.3–15.4)
GLOBULIN UR ELPH-MCNC: 2.7 GM/DL
GLUCOSE SERPL-MCNC: 108 MG/DL (ref 65–99)
GLUCOSE SERPL-MCNC: 110 MG/DL (ref 65–99)
HBA1C MFR BLD: 5.7 % (ref 4.8–5.6)
HCT VFR BLD AUTO: 38.7 % (ref 34–46.6)
HCT VFR BLD AUTO: 41.3 % (ref 34–46.6)
HDLC SERPL-MCNC: 49 MG/DL (ref 40–60)
HGB BLD-MCNC: 13 G/DL (ref 12–15.9)
HGB BLD-MCNC: 14 G/DL (ref 12–15.9)
IMM GRANULOCYTES # BLD AUTO: 0.02 10*3/MM3 (ref 0–0.05)
IMM GRANULOCYTES NFR BLD AUTO: 0.2 % (ref 0–0.5)
LDLC SERPL CALC-MCNC: 76 MG/DL (ref 0–100)
LDLC/HDLC SERPL: 1.56 {RATIO}
LYMPHOCYTES # BLD AUTO: 1.72 10*3/MM3 (ref 0.7–3.1)
LYMPHOCYTES NFR BLD AUTO: 13.7 % (ref 19.6–45.3)
MCH RBC QN AUTO: 30.4 PG (ref 26.6–33)
MCH RBC QN AUTO: 30.7 PG (ref 26.6–33)
MCHC RBC AUTO-ENTMCNC: 33.6 G/DL (ref 31.5–35.7)
MCHC RBC AUTO-ENTMCNC: 33.9 G/DL (ref 31.5–35.7)
MCV RBC AUTO: 90.6 FL (ref 79–97)
MCV RBC AUTO: 90.6 FL (ref 79–97)
MONOCYTES # BLD AUTO: 0.69 10*3/MM3 (ref 0.1–0.9)
MONOCYTES NFR BLD AUTO: 5.5 % (ref 5–12)
NEUTROPHILS NFR BLD AUTO: 77 % (ref 42.7–76)
NEUTROPHILS NFR BLD AUTO: 9.71 10*3/MM3 (ref 1.7–7)
NRBC BLD AUTO-RTO: 0 /100 WBC (ref 0–0.2)
NT-PROBNP SERPL-MCNC: 130.7 PG/ML (ref 0–900)
PLATELET # BLD AUTO: 207 10*3/MM3 (ref 140–450)
PLATELET # BLD AUTO: 222 10*3/MM3 (ref 140–450)
PMV BLD AUTO: 10.9 FL (ref 6–12)
PMV BLD AUTO: 11.2 FL (ref 6–12)
POTASSIUM SERPL-SCNC: 4.2 MMOL/L (ref 3.5–5.2)
POTASSIUM SERPL-SCNC: 4.3 MMOL/L (ref 3.5–5.2)
PROT SERPL-MCNC: 6.6 G/DL (ref 6–8.5)
PROT SERPL-MCNC: 6.8 G/DL (ref 6–8.5)
QT INTERVAL: 444 MS
QTC INTERVAL: 424 MS
RBC # BLD AUTO: 4.27 10*6/MM3 (ref 3.77–5.28)
RBC # BLD AUTO: 4.56 10*6/MM3 (ref 3.77–5.28)
SODIUM SERPL-SCNC: 139 MMOL/L (ref 136–145)
SODIUM SERPL-SCNC: 141 MMOL/L (ref 136–145)
TRIGL SERPL-MCNC: 74 MG/DL (ref 0–150)
TROPONIN T SERPL HS-MCNC: 67 NG/L
VLDLC SERPL-MCNC: 15 MG/DL (ref 5–40)
WBC NRBC COR # BLD AUTO: 12.6 10*3/MM3 (ref 3.4–10.8)
WBC NRBC COR # BLD AUTO: 7.89 10*3/MM3 (ref 3.4–10.8)

## 2025-03-14 PROCEDURE — 80053 COMPREHEN METABOLIC PANEL: CPT | Performed by: INTERNAL MEDICINE

## 2025-03-14 PROCEDURE — C9600 PERC DRUG-EL COR STENT SING: HCPCS | Performed by: INTERNAL MEDICINE

## 2025-03-14 PROCEDURE — 25010000002 ONDANSETRON PER 1 MG: Performed by: EMERGENCY MEDICINE

## 2025-03-14 PROCEDURE — 70498 CT ANGIOGRAPHY NECK: CPT

## 2025-03-14 PROCEDURE — 92979 ENDOLUMINL IVUS OCT C EA: CPT | Performed by: INTERNAL MEDICINE

## 2025-03-14 PROCEDURE — C1725 CATH, TRANSLUMIN NON-LASER: HCPCS | Performed by: INTERNAL MEDICINE

## 2025-03-14 PROCEDURE — 83036 HEMOGLOBIN GLYCOSYLATED A1C: CPT | Performed by: HOSPITALIST

## 2025-03-14 PROCEDURE — 92978 ENDOLUMINL IVUS OCT C 1ST: CPT | Performed by: INTERNAL MEDICINE

## 2025-03-14 PROCEDURE — 85027 COMPLETE CBC AUTOMATED: CPT | Performed by: HOSPITALIST

## 2025-03-14 PROCEDURE — 25010000002 MIDAZOLAM PER 1 MG: Performed by: INTERNAL MEDICINE

## 2025-03-14 PROCEDURE — C1874 STENT, COATED/COV W/DEL SYS: HCPCS | Performed by: INTERNAL MEDICINE

## 2025-03-14 PROCEDURE — 36415 COLL VENOUS BLD VENIPUNCTURE: CPT

## 2025-03-14 PROCEDURE — 96375 TX/PRO/DX INJ NEW DRUG ADDON: CPT

## 2025-03-14 PROCEDURE — 85347 COAGULATION TIME ACTIVATED: CPT

## 2025-03-14 PROCEDURE — C1887 CATHETER, GUIDING: HCPCS | Performed by: INTERNAL MEDICINE

## 2025-03-14 PROCEDURE — 83880 ASSAY OF NATRIURETIC PEPTIDE: CPT | Performed by: EMERGENCY MEDICINE

## 2025-03-14 PROCEDURE — 93458 L HRT ARTERY/VENTRICLE ANGIO: CPT | Performed by: INTERNAL MEDICINE

## 2025-03-14 PROCEDURE — 82248 BILIRUBIN DIRECT: CPT | Performed by: INTERNAL MEDICINE

## 2025-03-14 PROCEDURE — 82565 ASSAY OF CREATININE: CPT

## 2025-03-14 PROCEDURE — 80061 LIPID PANEL: CPT | Performed by: HOSPITALIST

## 2025-03-14 PROCEDURE — 84484 ASSAY OF TROPONIN QUANT: CPT | Performed by: EMERGENCY MEDICINE

## 2025-03-14 PROCEDURE — 25010000002 NICARDIPINE 2.5 MG/ML SOLUTION: Performed by: INTERNAL MEDICINE

## 2025-03-14 PROCEDURE — 70450 CT HEAD/BRAIN W/O DYE: CPT

## 2025-03-14 PROCEDURE — 96374 THER/PROPH/DIAG INJ IV PUSH: CPT

## 2025-03-14 PROCEDURE — 25010000002 METOCLOPRAMIDE PER 10 MG: Performed by: EMERGENCY MEDICINE

## 2025-03-14 PROCEDURE — 85025 COMPLETE CBC W/AUTO DIFF WBC: CPT | Performed by: EMERGENCY MEDICINE

## 2025-03-14 PROCEDURE — C1753 CATH, INTRAVAS ULTRASOUND: HCPCS | Performed by: INTERNAL MEDICINE

## 2025-03-14 PROCEDURE — 25010000002 DIPHENHYDRAMINE PER 50 MG: Performed by: EMERGENCY MEDICINE

## 2025-03-14 PROCEDURE — 92928 PRQ TCAT PLMT NTRAC ST 1 LES: CPT | Performed by: INTERNAL MEDICINE

## 2025-03-14 PROCEDURE — 70496 CT ANGIOGRAPHY HEAD: CPT

## 2025-03-14 PROCEDURE — 99285 EMERGENCY DEPT VISIT HI MDM: CPT

## 2025-03-14 PROCEDURE — 25010000002 LIDOCAINE PF 1% 1 % SOLUTION: Performed by: INTERNAL MEDICINE

## 2025-03-14 PROCEDURE — 25010000002 FENTANYL CITRATE (PF) 50 MCG/ML SOLUTION: Performed by: INTERNAL MEDICINE

## 2025-03-14 PROCEDURE — 25010000002 ONDANSETRON PER 1 MG: Performed by: INTERNAL MEDICINE

## 2025-03-14 PROCEDURE — 25010000002 HEPARIN (PORCINE) PER 1000 UNITS: Performed by: INTERNAL MEDICINE

## 2025-03-14 PROCEDURE — C1769 GUIDE WIRE: HCPCS | Performed by: INTERNAL MEDICINE

## 2025-03-14 PROCEDURE — 25510000001 IOPAMIDOL PER 1 ML: Performed by: INTERNAL MEDICINE

## 2025-03-14 PROCEDURE — 25510000001 IOPAMIDOL PER 1 ML: Performed by: EMERGENCY MEDICINE

## 2025-03-14 PROCEDURE — C1894 INTRO/SHEATH, NON-LASER: HCPCS | Performed by: INTERNAL MEDICINE

## 2025-03-14 PROCEDURE — 93005 ELECTROCARDIOGRAM TRACING: CPT | Performed by: INTERNAL MEDICINE

## 2025-03-14 PROCEDURE — 93010 ELECTROCARDIOGRAM REPORT: CPT | Performed by: INTERNAL MEDICINE

## 2025-03-14 PROCEDURE — 25810000003 SODIUM CHLORIDE 0.9 % SOLUTION: Performed by: INTERNAL MEDICINE

## 2025-03-14 DEVICE — XIENCE SKYPOINT™ EVEROLIMUS ELUTING CORONARY STENT SYSTEM 2.25 MM X 23 MM / RAPID-EXCHANGE
Type: IMPLANTABLE DEVICE | Site: CORONARY | Status: FUNCTIONAL
Brand: XIENCE SKYPOINT™

## 2025-03-14 DEVICE — XIENCE SKYPOINT™ EVEROLIMUS ELUTING CORONARY STENT SYSTEM 2.50 MM X 33 MM / RAPID-EXCHANGE
Type: IMPLANTABLE DEVICE | Site: CORONARY | Status: FUNCTIONAL
Brand: XIENCE SKYPOINT™

## 2025-03-14 RX ORDER — FENTANYL CITRATE 50 UG/ML
INJECTION, SOLUTION INTRAMUSCULAR; INTRAVENOUS
Status: DISCONTINUED | OUTPATIENT
Start: 2025-03-14 | End: 2025-03-14 | Stop reason: HOSPADM

## 2025-03-14 RX ORDER — CLOPIDOGREL BISULFATE 75 MG/1
TABLET ORAL
Status: DISCONTINUED | OUTPATIENT
Start: 2025-03-14 | End: 2025-03-14 | Stop reason: HOSPADM

## 2025-03-14 RX ORDER — IOPAMIDOL 755 MG/ML
INJECTION, SOLUTION INTRAVASCULAR
Status: DISCONTINUED | OUTPATIENT
Start: 2025-03-14 | End: 2025-03-14 | Stop reason: HOSPADM

## 2025-03-14 RX ORDER — ONDANSETRON 2 MG/ML
4 INJECTION INTRAMUSCULAR; INTRAVENOUS ONCE
Status: COMPLETED | OUTPATIENT
Start: 2025-03-14 | End: 2025-03-14

## 2025-03-14 RX ORDER — SODIUM CHLORIDE 9 MG/ML
3 INJECTION, SOLUTION INTRAVENOUS CONTINUOUS
Status: ACTIVE | OUTPATIENT
Start: 2025-03-14 | End: 2025-03-14

## 2025-03-14 RX ORDER — MIDAZOLAM HYDROCHLORIDE 1 MG/ML
INJECTION, SOLUTION INTRAMUSCULAR; INTRAVENOUS
Status: DISCONTINUED | OUTPATIENT
Start: 2025-03-14 | End: 2025-03-14 | Stop reason: HOSPADM

## 2025-03-14 RX ORDER — DIPHENHYDRAMINE HYDROCHLORIDE 50 MG/ML
25 INJECTION INTRAMUSCULAR; INTRAVENOUS ONCE
Status: COMPLETED | OUTPATIENT
Start: 2025-03-14 | End: 2025-03-14

## 2025-03-14 RX ORDER — NITROGLYCERIN 0.4 MG/1
0.4 TABLET SUBLINGUAL
Status: DISCONTINUED | OUTPATIENT
Start: 2025-03-14 | End: 2025-03-14 | Stop reason: HOSPADM

## 2025-03-14 RX ORDER — IOPAMIDOL 755 MG/ML
82 INJECTION, SOLUTION INTRAVASCULAR
Status: COMPLETED | OUTPATIENT
Start: 2025-03-14 | End: 2025-03-14

## 2025-03-14 RX ORDER — ASPIRIN 81 MG/1
324 TABLET, CHEWABLE ORAL ONCE
Status: COMPLETED | OUTPATIENT
Start: 2025-03-14 | End: 2025-03-14

## 2025-03-14 RX ORDER — SODIUM CHLORIDE 0.9 % (FLUSH) 0.9 %
10 SYRINGE (ML) INJECTION AS NEEDED
Status: DISCONTINUED | OUTPATIENT
Start: 2025-03-14 | End: 2025-03-15 | Stop reason: HOSPADM

## 2025-03-14 RX ORDER — METOCLOPRAMIDE HYDROCHLORIDE 5 MG/ML
10 INJECTION INTRAMUSCULAR; INTRAVENOUS ONCE
Status: COMPLETED | OUTPATIENT
Start: 2025-03-14 | End: 2025-03-14

## 2025-03-14 RX ORDER — SODIUM CHLORIDE 9 MG/ML
40 INJECTION, SOLUTION INTRAVENOUS AS NEEDED
Status: DISCONTINUED | OUTPATIENT
Start: 2025-03-14 | End: 2025-03-14 | Stop reason: HOSPADM

## 2025-03-14 RX ORDER — SODIUM CHLORIDE 0.9 % (FLUSH) 0.9 %
10 SYRINGE (ML) INJECTION EVERY 12 HOURS SCHEDULED
Status: DISCONTINUED | OUTPATIENT
Start: 2025-03-14 | End: 2025-03-14 | Stop reason: HOSPADM

## 2025-03-14 RX ORDER — HEPARIN SODIUM 1000 [USP'U]/ML
INJECTION, SOLUTION INTRAVENOUS; SUBCUTANEOUS
Status: DISCONTINUED | OUTPATIENT
Start: 2025-03-14 | End: 2025-03-14 | Stop reason: HOSPADM

## 2025-03-14 RX ORDER — MORPHINE SULFATE 4 MG/ML
4 INJECTION, SOLUTION INTRAMUSCULAR; INTRAVENOUS
Status: DISCONTINUED | OUTPATIENT
Start: 2025-03-14 | End: 2025-03-15 | Stop reason: HOSPADM

## 2025-03-14 RX ORDER — ASPIRIN 81 MG/1
81 TABLET ORAL DAILY
Status: DISCONTINUED | OUTPATIENT
Start: 2025-03-15 | End: 2025-03-14 | Stop reason: HOSPADM

## 2025-03-14 RX ORDER — ACETAMINOPHEN 325 MG/1
650 TABLET ORAL EVERY 4 HOURS PRN
Status: DISCONTINUED | OUTPATIENT
Start: 2025-03-14 | End: 2025-03-14 | Stop reason: HOSPADM

## 2025-03-14 RX ORDER — SODIUM CHLORIDE 0.9 % (FLUSH) 0.9 %
10 SYRINGE (ML) INJECTION AS NEEDED
Status: DISCONTINUED | OUTPATIENT
Start: 2025-03-14 | End: 2025-03-14 | Stop reason: HOSPADM

## 2025-03-14 RX ORDER — CLOPIDOGREL BISULFATE 75 MG/1
75 TABLET ORAL DAILY
Qty: 90 TABLET | Refills: 3 | Status: SHIPPED | OUTPATIENT
Start: 2025-03-14

## 2025-03-14 RX ORDER — LIDOCAINE HYDROCHLORIDE 10 MG/ML
INJECTION, SOLUTION EPIDURAL; INFILTRATION; INTRACAUDAL; PERINEURAL
Status: DISCONTINUED | OUTPATIENT
Start: 2025-03-14 | End: 2025-03-14 | Stop reason: HOSPADM

## 2025-03-14 RX ADMIN — DIPHENHYDRAMINE HYDROCHLORIDE 25 MG: 50 INJECTION INTRAMUSCULAR; INTRAVENOUS at 19:32

## 2025-03-14 RX ADMIN — ONDANSETRON 4 MG: 2 INJECTION INTRAMUSCULAR; INTRAVENOUS at 12:28

## 2025-03-14 RX ADMIN — SODIUM CHLORIDE 3 ML/KG/HR: 9 INJECTION, SOLUTION INTRAVENOUS at 08:39

## 2025-03-14 RX ADMIN — METOCLOPRAMIDE HYDROCHLORIDE 10 MG: 10 INJECTION, SOLUTION INTRAMUSCULAR; INTRAVENOUS at 19:33

## 2025-03-14 RX ADMIN — Medication 10 ML: at 12:28

## 2025-03-14 RX ADMIN — IOPAMIDOL 82 ML: 755 INJECTION, SOLUTION INTRAVENOUS at 20:16

## 2025-03-14 RX ADMIN — ASPIRIN 324 MG: 81 TABLET, CHEWABLE ORAL at 08:39

## 2025-03-14 RX ADMIN — ONDANSETRON 4 MG: 2 INJECTION INTRAMUSCULAR; INTRAVENOUS at 19:33

## 2025-03-14 NOTE — ED PROVIDER NOTES
[place]    EMERGENCY DEPARTMENT ENCOUNTER      Pt Name: Otilia Cano  MRN: [id number]  YOB: 1960  Date of evaluation: 3/14/2025  Provider: Leandro Calle DO    CHIEF COMPLAINT       Chief Complaint   Patient presents with    Post-op Problem    Headache       HPI  Stated Reason for Visit: PT HAD A HEART CATH DONE TODAY AND JUST GOT HOME WHEN HER HEADACHE STARTED RIGHT BEHIND THE LEFT EYE. STATES IT HURTS PRETTY BAD. NAUSEATED WITH THE PAIN. SHE HAD TWO STENTS PLACED. DR BANKS DID HER HEART CATH.History Obtained From: patientPrecipitating Event(s): recent surgery       HISTORY OF PRESENT ILLNESS  (Location/Symptom, Timing/Onset, Context/Setting, Quality, Duration, Modifying Factors, Severity.)   Otilia Cano is a 64 y.o. female who presents to the emergency department for evaluation of a left-sided headache which she notes started behind the left eye couple hours prior to arrival.  She just went home from the hospital after a cardiac catheterization earlier today with cardiologist Dr. Banks.  After she went home started noticing this left eye pain after she was eating dinner.  She is on aspirin, Plavix, she restarted back on the Plavix tomorrow per her cardiologist.  Patient notes she has had some intermittent headaches in the past, has not been previously diagnosed with recurrent migraines.  She has had some headaches with the nitro with the catheterization.  She denies any fall, injury or head trauma, denies any vision changes, no unilateral weakness, numbness or tingling, no active chest pain, states otherwise she feels well.    Nursing notes were reviewed.      PAST MEDICAL HISTORY     Past Medical History:   Diagnosis Date    Abnormal heart rhythm     Anemia     H/O Hashimoto thyroiditis     Hashimoto's thyroiditis     Hx of echocardiogram 12/18/2015    ejection fraction of 60% to 65% with normal diastolic filling    Hyperlipidemia     Hypertension     I was under control  with exercise but sprained my ankle last summer and haven't been able to exercise until recently. My blood pressure has been up lately due to family stressors.    Hypothyroidism     Not sure of first date but many years ago    Palpitations     Syncope     Neg tilt table study 3/2/16, echo 11/15 normal EF 60-65%    Vitamin D deficiency          SURGICAL HISTORY       Past Surgical History:   Procedure Laterality Date    BREAST BIOPSY Left      SECTION      1993,     HYSTERECTOMY      OOPHORECTOMY           CURRENT MEDICATIONS       Current Facility-Administered Medications:     Morphine sulfate (PF) injection 4 mg, 4 mg, Intravenous, Q30 Min PRN, Leandro Calle,     [COMPLETED] Insert Peripheral IV, , , Once **AND** sodium chloride 0.9 % flush 10 mL, 10 mL, Intravenous, PRN, Leandro Calle, DO    Current Outpatient Medications:     aspirin 81 MG EC tablet, Take 1 tablet by mouth Daily., Disp: , Rfl:     atorvastatin (LIPITOR) 40 MG tablet, Take 1 tablet by mouth Daily., Disp: , Rfl:     Cholecalciferol (VITAMIN D3) 2000 UNITS tablet, Take  by mouth., Disp: , Rfl:     clopidogrel (PLAVIX) 75 MG tablet, Take 1 tablet by mouth Daily., Disp: 90 tablet, Rfl: 3    cyanocobalamin 1000 MCG/ML injection, INJECT 1 ML SUBCUTANEOUSLY EVERY MONTH, Disp: , Rfl:     dexAMETHasone 0.5 MG/5ML solution, RINSE AND HOLD 5 MLS FOR 2 MINUTES THEN SPIT FOUR TIMES A DAY AS NEEDED, Disp: , Rfl:     estradiol (MINIVELLE) 0.05 MG/24HR patch, Place 1 patch on the skin as directed by provider Every 14 (Fourteen) Days., Disp: , Rfl:     levothyroxine (SYNTHROID, LEVOTHROID) 88 MCG tablet, Take 1 tablet by mouth Daily., Disp: 90 tablet, Rfl: 3    lisinopril (PRINIVIL,ZESTRIL) 5 MG tablet, Take 1 tablet by mouth Daily., Disp: , Rfl:     ALLERGIES     Codeine, Rosuvastatin, and Statins    FAMILY HISTORY       Family History   Problem Relation Age of Onset    Hypertension Mother     Obesity Mother     Thyroid disease  Mother     Heart attack Mother     Bone cancer Father     Breast cancer Maternal Aunt     Heart attack Other           SOCIAL HISTORY       Social History     Socioeconomic History    Marital status:    Tobacco Use    Smoking status: Former     Current packs/day: 0.00     Types: Cigarettes     Quit date:      Years since quittin.2     Passive exposure: Past    Smokeless tobacco: Never    Tobacco comments:     I smoked socially when going out in my early twenties for a short time, about a year sporadically   Vaping Use    Vaping status: Never Used   Substance and Sexual Activity    Alcohol use: Yes     Alcohol/week: 2.0 standard drinks of alcohol     Types: 1 Glasses of wine, 1 Drinks containing 0.5 oz of alcohol per week     Comment: Not every week. Maybe every other week and on vacation    Drug use: Never    Sexual activity: Yes     Partners: Male     Birth control/protection: Hysterectomy     Comment: I am . We are monogamous with each other         PHYSICAL EXAM       Vitals:    25 1924 25 2100 25 2130 25 2200   BP: 168/78 138/70 138/72 130/60   BP Location:       Patient Position:       Pulse: 79  69 92   Resp:       Temp:       TempSrc:       SpO2: 99%   95%   Weight:       Height:           Physical Exam  General : Patient is awake, alert, oriented, in no acute distress, nontoxic appearing  HEENT: Pupils are equally round, EOMI, conjunctivae clear, there is no injection no icterus.  Oral mucosa is moist, uvula midline  Neck: Neck is supple, full range of motion, trachea midline  Cardiac: Heart regular rate, rhythm, no murmurs, rubs, or gallops  Lungs: Lungs are clear to auscultation, there is no wheezing, rhonchi, or rales. There is no use of accessory muscles  Abdomen: Abdomen is soft, nontender, nondistended. There are no firm or pulsatile masses, no rebound rigidity or guarding  Musculoskeletal: 5 out of 5 strength in all 4 extremities.  No focal muscle  deficits are appreciated  Neuro: Patient is nontoxic-appearing, no focal neurological decile examination, answering questions appropriately, motor intact, sensory intact, level of consciousness is normal, cerebellar function is normal, reflexes are grossly normal. No evidence of incontinence or loss of bowel or bladder function, no saddle anesthesia noted   Dermatology: Right radial insertion site is pressure bandage, appears to be healing appropriately skin is warm and dry  Psych: Mentation is grossly normal, cognition is grossly normal. Affect is appropriate      DIAGNOSTIC RESULTS     EKG:  All EKGs are interpreted by the Emergency Department Physician who either signs or Co-signs this chart in the absence of a cardiologist.    No orders to display       RADIOLOGY:     [x] Radiologist's Report Reviewed:  CT Head Without Contrast   Final Result   Impression:      1. No acute intracranial abnormality.            Electronically Signed: Azar Benitez MD     3/14/2025 8:54 PM EDT     Workstation ID: [id number]      CT Angiogram Head   Final Result   Impression:   1.No significant carotid stenosis by NASCET criteria.   2.Diminutive basilar artery. It looks like there are fetal origins to the posterior cerebral arteries which could account for this.            Electronically Signed: Brian Gamino MD     3/14/2025 9:05 PM EDT     Workstation ID: [id number]      CT Angiogram Neck   Final Result   Impression:   1.No significant carotid stenosis by NASCET criteria.   2.Diminutive basilar artery. It looks like there are fetal origins to the posterior cerebral arteries which could account for this.            Electronically Signed: Brian Gamino MD     3/14/2025 9:05 PM EDT     Workstation ID: [id number]          I ordered and independently reviewed the above noted radiographic studies.      I viewed images of CT head which showed no acute intracranial process, no hemorrhage appreciated per my independent interpretation.    See  radiologist's dictation for official interpretation.        LABS:    I have reviewed and interpreted all of the currently available lab results from this visit (if applicable):  Results for orders placed or performed during the hospital encounter of 03/14/25   CBC Auto Differential    Collection Time: 03/14/25  7:36 PM    Specimen: Blood   Result Value Ref Range    WBC 12.60 (H) 3.40 - 10.80 10*3/mm3    RBC 4.56 3.77 - 5.28 10*6/mm3    Hemoglobin 14.0 12.0 - 15.9 g/dL    Hematocrit 41.3 34.0 - 46.6 %    MCV 90.6 79.0 - 97.0 fL    MCH 30.7 26.6 - 33.0 pg    MCHC 33.9 31.5 - 35.7 g/dL    RDW 12.6 12.3 - 15.4 %    RDW-SD 40.5 37.0 - 54.0 fl    MPV 11.2 6.0 - 12.0 fL    Platelets 222 140 - 450 10*3/mm3    Neutrophil % 77.0 (H) 42.7 - 76.0 %    Lymphocyte % 13.7 (L) 19.6 - 45.3 %    Monocyte % 5.5 5.0 - 12.0 %    Eosinophil % 3.0 0.3 - 6.2 %    Basophil % 0.6 0.0 - 1.5 %    Immature Grans % 0.2 0.0 - 0.5 %    Neutrophils, Absolute 9.71 (H) 1.70 - 7.00 10*3/mm3    Lymphocytes, Absolute 1.72 0.70 - 3.10 10*3/mm3    Monocytes, Absolute 0.69 0.10 - 0.90 10*3/mm3    Eosinophils, Absolute 0.38 0.00 - 0.40 10*3/mm3    Basophils, Absolute 0.08 0.00 - 0.20 10*3/mm3    Immature Grans, Absolute 0.02 0.00 - 0.05 10*3/mm3    nRBC 0.0 0.0 - 0.2 /100 WBC   Comprehensive Metabolic Panel    Collection Time: 03/14/25  8:44 PM    Specimen: Blood   Result Value Ref Range    Glucose 108 (H) 65 - 99 mg/dL    BUN 14 8 - 23 mg/dL    Creatinine 0.77 0.57 - 1.00 mg/dL    Sodium 139 136 - 145 mmol/L    Potassium 4.2 3.5 - 5.2 mmol/L    Chloride 103 98 - 107 mmol/L    CO2 22.0 22.0 - 29.0 mmol/L    Calcium 8.9 8.6 - 10.5 mg/dL    Total Protein 6.8 6.0 - 8.5 g/dL    Albumin 4.1 3.5 - 5.2 g/dL    ALT (SGPT) 12 1 - 33 U/L    AST (SGOT) 18 1 - 32 U/L    Alkaline Phosphatase 44 39 - 117 U/L    Total Bilirubin 0.7 0.0 - 1.2 mg/dL    Globulin 2.7 gm/dL    A/G Ratio 1.5 g/dL    BUN/Creatinine Ratio 18.2 7.0 - 25.0    Anion Gap 14.0 5.0 - 15.0 mmol/L     eGFR 85.7 >60.0 mL/min/1.73   BNP    Collection Time: 03/14/25  8:44 PM    Specimen: Blood   Result Value Ref Range    proBNP 130.7 0.0 - 900.0 pg/mL   High Sensitivity Troponin T    Collection Time: 03/14/25  8:44 PM    Specimen: Blood   Result Value Ref Range    HS Troponin T 67 (C) <14 ng/L        If labs were ordered, I independently reviewed the results and considered them in treating the patient.      EMERGENCY DEPARTMENT COURSE and DIFFERENTIAL DIAGNOSIS/MDM:   Vitals:  AS OF 22:29 EDT    BP - 130/60  HR - 92  TEMP - 97.7 °F (36.5 °C) (Oral)  O2 SATS - 95%      Orders placed during this visit:  Orders Placed This Encounter   Procedures    CT Head Without Contrast    CT Angiogram Head    CT Angiogram Neck    Comprehensive Metabolic Panel    BNP    High Sensitivity Troponin T    CBC Auto Differential    High Sensitivity Troponin T 1Hr    Insert Peripheral IV    CBC & Differential       All labs have been independently reviewed by me.  All radiology studies have been reviewed by me and the radiologist dictating the report.  All EKG's have been independently viewed and interpreted by me.      Discussion below represents my analysis of pertinent findings related to patient's condition, differential diagnosis, treatment plan and final disposition.    Differential diagnosis:  The differential diagnosis associated with the patient's presentation includes: Postprocedural headache, vasospasm, arterial dissection, aneurysm, headache, migraine    Additional sources  Discussed/ obtained information from independent historians:   [x] Spouse  [] Parent  [] Family member  [] Friend  [] EMS   [] Other:    External (non-ED) record review:   [x] Inpatient record: Reviewed patient's recent hospitalization with recent transfer from outside hospital, undergoing cardiac catheterization with stent placement   [] Office record:   [] Outpatient record:   [] Prior Outpatient labs:   [] Prior Outpatient radiology:   [] Primary Care  record:   [] Outside ED record:   [] Other:     Patient's care impacted by:   [] Diabetes  [] Hypertension  [] CHF  [] Hyperlipidemia  [x] Coronary Artery Disease   [] COPD   [] Cancer   [] Tobacco Abuse   [] Substance Abuse    [] Other:     Care significantly affected by Social Determinants of Health (housing and economic circumstances, unemployment)    [] Yes     [x] No   If yes, Patient's care significantly limited by Social Determinants of Health including:   [] Inadequate housing   [] Low income   [] Alcoholism and drug addiction in family   [] Problems related to primary support group   [] Unemployment   [] Problems related to employment   [] Other Social Determinants of Health:       MEDICATIONS ADMINISTERED IN ED:  Medications   Morphine sulfate (PF) injection 4 mg (has no administration in time range)   sodium chloride 0.9 % flush 10 mL (has no administration in time range)   ondansetron (ZOFRAN) injection 4 mg (4 mg Intravenous Given 3/14/25 1933)   metoclopramide (REGLAN) injection 10 mg (10 mg Intravenous Given 3/14/25 1933)   diphenhydrAMINE (BENADRYL) injection 25 mg (25 mg Intravenous Given 3/14/25 1932)   iopamidol (ISOVUE-370) 76 % injection 82 mL (82 mL Intravenous Given 3/14/25 2016)              This is a very pleasant 64-year-old female who has had a left-sided headache onset this evening after she got home from the hospital after 2 stent placement earlier today.  The patient is did not have any focal neurological deficit on my examination, given her recent catheterization with the unilateral headache was concerned that it would be necessary for CT head, angiography of the head and neck to further assess.  Thankfully these studies revealed no acute intracranial normality, no signs of dissection or aneurysm.  She was given a headache cocktail for migraine treatment and on reassessment her headache has greatly improved, she is ambulatory to the bathroom without difficulty.  She is monitored in the  ED for about 3 and half hours.  Blood work labs are stable, consistent with her recent cardiac catheterization, she is chest pain-free.  She feels comfortable going home where she can continue her medications as previously prescribed, follow-up with her PCP, return to the ED with any worsening symptoms or further concerns in the meantime.    I had a discussion with the patient/family regarding diagnosis, diagnostic results, treatment plan, and medications.  The patient/family indicated understanding of these instructions.  I spent adequate time at the bedside preceding discharge necessary to personally discuss the aftercare instructions, giving patient education, providing explanations of the results of our evaluations/findings, and my decision making to assure that the patient/family understand the plan of care.  Time was allotted to answer questions at that time and throughout the ED course.  Emphasis was placed on timely follow-up after discharge.  I also discussed the potential for the development of an acute emergent condition requiring further evaluation, admission, or even surgical intervention. I discussed that we found nothing during the visit today indicating the need for further workup, admission, or the presence of an unstable medical condition.  I encouraged the patient to return to the emergency department immediately for ANY concerns, worsening, new complaints, or if symptoms persist and unable to seek follow-up in a timely fashion.  The patient/family expressed understanding and agreement with this plan.  The patient will follow-up with their PCP in 1-2 days for reevaluation.     PROCEDURES:  Procedures    CRITICAL CARE TIME    Total Critical Care time was 0 minutes, excluding separately reportable procedures.   There was a high probability of clinically significant/life threatening deterioration in the patient's condition which required my urgent intervention.      FINAL IMPRESSION      1.  Left-sided headache    2. H/O cardiac catheterization          DISPOSITION/PLAN     ED Disposition       ED Disposition   Discharge    Condition   Stable    Comment   --               PATIENT REFERRED TO:  Lorena Camp, PA  [address] [place] [address] [address]  [place] [id number] [phone]    In 2 days      [facility] EMERGENCY DEPARTMENT  [address]  [facility] [address] [phone]    If symptoms worsen    Eduardo Sow MD  [address]  [address]  [place] [address]  [phone]    Schedule an appointment as soon as possible for a visit   Your cardiologist      DISCHARGE MEDICATIONS:     Medication List        CONTINUE taking these medications      aspirin 81 MG EC tablet     atorvastatin 40 MG tablet  Commonly known as: LIPITOR     clopidogrel 75 MG tablet  Commonly known as: PLAVIX  Take 1 tablet by mouth Daily.     cyanocobalamin 1000 MCG/ML injection     dexAMETHasone 0.5 MG/5ML solution     levothyroxine 88 MCG tablet  Commonly known as: SYNTHROID, LEVOTHROID  Take 1 tablet by mouth Daily.     lisinopril 5 MG tablet  Commonly known as: PRINIVIL,ZESTRIL     Minivelle 0.05 MG/24HR patch  Generic drug: estradiol     Vitamin D3 50 MCG (2000 UT) tablet                  Comment: Please note this report has been produced using speech recognition software.      Leandro Calle DO  Attending Emergency Physician         Leandro Calle DO  03/14/25 [id number]

## 2025-03-14 NOTE — Clinical Note
First balloon inflation max pressure = 12 nikhil. First balloon inflation duration = 15 seconds. Second inflation of balloon - Max pressure = 16 nikhil. 2nd Inflation of balloon - Duration = 15 seconds. 2nd inflation was done at 10:45 EDT. Third inflation of balloon - Max pressure = 18 nikhil. 3rd Inflation of balloon - Duration = 15 seconds. 3rd inflation was done at 10:46 EDT.

## 2025-03-14 NOTE — Clinical Note
A 6 fr sheath was successfully inserted with ultrasound guidance into the right radial artery. Sheath insertion not delayed. no

## 2025-03-14 NOTE — Clinical Note
First balloon inflation max pressure = 12 nikhil. First balloon inflation duration = 12 seconds. Second inflation of balloon - Max pressure = 18 nikhil. 2nd Inflation of balloon - Duration = 12 seconds. 2nd inflation was done at 11:10 EDT. Third inflation of balloon - Max pressure = 18 nikhil. 3rd Inflation of balloon - Duration = 10 seconds. 3rd inflation was done at 11:11 EDT.

## 2025-03-14 NOTE — PROGRESS NOTES
Pt. Referred for Phase II Cardiac Rehab. Staff discussed benefits of exercise, program protocol, and educational material provided. Teach back verified.  Patient scheduled for orientation at [facility] on Tuesday, March 25th 2025 at 9:00am.

## 2025-03-14 NOTE — Clinical Note
First balloon inflation max pressure = 12 nikhil. First balloon inflation duration = 14 seconds. Second inflation of balloon - Max pressure = 16 nikhil. 2nd Inflation of balloon - Duration = 10 seconds. 2nd inflation was done at 11:12 EDT. Third inflation of balloon - Max pressure = 18 nikhil. 3rd Inflation of balloon - Duration = 15 seconds. 3rd inflation was done at 11:13 EDT.

## 2025-03-14 NOTE — H&P
[facility]   [address], [address]  [place], [place], [address] [phone]  [email or web address]           HISTORY AND PHYSICAL NOTE    Patient Care Team:  Patient Care Team:  Lorena Camp PA as PCP - General (Family Medicine)  Daly Calle DO as Consulting Physician (Endocrinology)  Eduardo Sow MD as Consulting Physician (Cardiology)      Chief complaint: No chief complaint on file.           Subjective:     Cardiac focused problem list:  Chest pain syndrome   GXT, 2/26/2025:  1 mm ST depressions noted in the inferior leads suggestive of ischemia. No inducible chest pain.   Bradycardia   Stress test, 11/2015 without ischemia.   Tilt table test, 3/16/2016:  Negative tilt table test.   Prominent symptoms following COVID 2/2024, fatigue, dizziness, slower heart rate  PVCs, palpitations  30 day heart monitor, 6/16/2016:  No significant arrhythmias.   Hyperlipidemia   Hypothyroidism    HPI:      Otilia Cano is a 64 y.o. female.  Patient with recent chest discomfort and intermediate findings on recent stress test at OSH.  Was started on Imdur.  Only minimal improvement in symptoms on Imdur.  Having headaches with Imdur.  Presents today for definitive evaluation of her coronary arteries with left heart catheterization.     Review of Systems:  As noted in the HPI    PFSH:  Patient Active Problem List   Diagnosis    Lymphocytic thyroiditis    Hypothyroidism    Hyperlipidemia    Palpitations    Lichen planus    Precordial chest pain       No current facility-administered medications on file prior to encounter.     Current Outpatient Medications on File Prior to Encounter   Medication Sig Dispense Refill    aspirin 81 MG EC tablet Take 1 tablet by mouth Daily.      atorvastatin (LIPITOR) 40 MG tablet Take 1 tablet by mouth Daily.      Cholecalciferol (VITAMIN D3) 2000 UNITS tablet Take  by mouth. (Patient not taking: Reported on 6/10/2024)       cyanocobalamin 1000 MCG/ML injection INJECT 1 ML SUBCUTANEOUSLY EVERY MONTH      dexAMETHasone 0.5 MG/5ML solution RINSE AND HOLD 5 MLS FOR 2 MINUTES THEN SPIT FOUR TIMES A DAY AS NEEDED      estradiol (MINIVELLE) 0.05 MG/24HR patch Place 1 patch on the skin as directed by provider Every 14 (Fourteen) Days.      isosorbide mononitrate (IMDUR) 30 MG 24 hr tablet Take 0.5 tablets by mouth Daily. 90 tablet 3    levothyroxine (SYNTHROID, LEVOTHROID) 88 MCG tablet Take 1 tablet by mouth Daily. 90 tablet 3    lisinopril (PRINIVIL,ZESTRIL) 5 MG tablet Take 1 tablet by mouth Daily.         Social History     Socioeconomic History    Marital status:    Tobacco Use    Smoking status: Former     Current packs/day: 0.00     Types: Cigarettes     Quit date:      Years since quittin.2     Passive exposure: Past    Smokeless tobacco: Never    Tobacco comments:     I smoked socially when going out in my early twenties for a short time, about a year sporadically   Vaping Use    Vaping status: Never Used   Substance and Sexual Activity    Alcohol use: Yes     Alcohol/week: 2.0 standard drinks of alcohol     Types: 1 Glasses of wine, 1 Drinks containing 0.5 oz of alcohol per week     Comment: Not every week. Maybe every other week and on vacation    Drug use: Never    Sexual activity: Yes     Partners: Male     Birth control/protection: Hysterectomy     Comment: I am . We are monogamous with each other            Objective:     Vital Sign Min/Max for last 24 hours  No data recorded   No data recorded   No data recorded   No data recorded   No data recorded   No data recorded    No intake or output data in the 24 hours ending 25 0820        There were no vitals filed for this visit.  CONSTITUTIONAL: No acute distress  RESPIRATORY: Normal effort.   CARDIOVASCULAR: Regular rate and rhythm   PERIPHERAL VASCULAR: Normal radial pulse. Right radial Barbeau type a. There is no lower extremity edema  bilaterally.    Labs and radiologic results:  Today's results were reviewed by myself.    Cardiac Data:    Results for orders placed during the hospital encounter of 07/17/24    Adult Transthoracic Echo Complete w/ Color, Spectral and Contrast if necessary per protocol    Interpretation Summary    Left ventricular systolic function is normal. Left ventricular ejection fraction appears to be 56 - 60%.    Mild aortic valve regurgitation is present.    Mild mitral valve regurgitation is present.           Assessment and Plan:     Problem list:    Precordial chest pain      ASSESSMENT:  Chest pain   Recent anginal symptoms.   OSH stress test with intermediate findings.    Bradycardia   PVCs   Hyperlipidemia     PLAN:  Left heart catheterization +/- PCI via right radial approach with Dr. Sow.  Right radial Barbeau type a.  Will review labs when available.   The risks, benefits, and alternatives of the procedure have been reviewed and the patient wishes to proceed.   Further recommendations to follow procedure.     Electronically signed by VERO Sinclair, 03/14/25, 9:18 AM EDT.

## 2025-03-15 LAB
ACT BLD: 239 SECONDS (ref 82–152)
ACT BLD: 256 SECONDS (ref 82–152)
ACT BLD: 331 SECONDS (ref 82–152)

## 2025-03-17 ENCOUNTER — CALL CENTER PROGRAMS (OUTPATIENT)
Dept: CALL CENTER | Facility: HOSPITAL | Age: 65
End: 2025-03-17
Payer: COMMERCIAL

## 2025-03-17 NOTE — OUTREACH NOTE
"PCI/Device Survey      Flowsheet Row Responses   Facility patient discharged from? [place]   Procedure date 03/14/25   Procedure (if device, specify in description) PCI   PCI site Right, Arm   Performing MD Dr. Eduardo Sow   Attempt successful? Yes   Call start time 0919   Call end time 0934   Symptom comments Pt reports that she has \"worst headache of my life\" after discharge, pt went to ER, was given HA cocktail that resolved the pain. The patient reports that since her procedure, she has felt more energized as if she is taking in more oxygen then ever before.   Is the patient taking prescribed medications: Plavix, ASA   Nursing intervention Reminded to continue to take prescribed medications, Nurse provided patient education   Medication comments Pt reports understanding that the Cardiologist stopped Isosorbide.   Does the patient have any of the following symptoms related to the cath/surgical site? Bruising  [Right radial site, bruised & sore but no other issues noted per pt report. Pt report understanding of her DC instructions.]   Nursing intervention Patient education provided   Does the patient have an appointment scheduled with the cardiologist? Yes   If the patient is a current smoker, are they able to teach back resources for cessation? Not a smoker   Did the patient feel prepared to go home on the same day as the procedure? Yes   Is the patient satisfied with the same day discharge process? Yes   PCI/Device call completed Yes            Mary NUNEZ - Registered Nurse  "

## 2025-03-18 LAB
QT INTERVAL: 444 MS
QTC INTERVAL: 424 MS

## 2025-03-25 ENCOUNTER — TREATMENT (OUTPATIENT)
Dept: CARDIAC REHAB | Facility: HOSPITAL | Age: 65
End: 2025-03-25
Payer: COMMERCIAL

## 2025-03-25 DIAGNOSIS — Z95.5 STENTED CORONARY ARTERY: Primary | ICD-10-CM

## 2025-03-25 DIAGNOSIS — Z95.5 STENTED CORONARY ARTERY: ICD-10-CM

## 2025-03-25 PROCEDURE — 93798 PHYS/QHP OP CAR RHAB W/ECG: CPT

## 2025-03-25 NOTE — PROGRESS NOTES
Pt attended Phase II Orientation.  Heart and lung assessment completed per RN and within normal limits.  Fall risk assessment completed.  See [facility] for details.

## 2025-03-27 ENCOUNTER — TREATMENT (OUTPATIENT)
Dept: CARDIAC REHAB | Facility: HOSPITAL | Age: 65
End: 2025-03-27
Payer: COMMERCIAL

## 2025-03-27 ENCOUNTER — OFFICE VISIT (OUTPATIENT)
Dept: CARDIOLOGY | Facility: CLINIC | Age: 65
End: 2025-03-27
Payer: COMMERCIAL

## 2025-03-27 VITALS
OXYGEN SATURATION: 99 % | DIASTOLIC BLOOD PRESSURE: 68 MMHG | BODY MASS INDEX: 27.88 KG/M2 | HEIGHT: 67 IN | SYSTOLIC BLOOD PRESSURE: 118 MMHG | WEIGHT: 177.6 LBS | HEART RATE: 55 BPM

## 2025-03-27 DIAGNOSIS — I25.708 CORONARY ARTERY DISEASE OF BYPASS GRAFT OF NATIVE HEART WITH STABLE ANGINA PECTORIS: Primary | ICD-10-CM

## 2025-03-27 DIAGNOSIS — R00.1 BRADYCARDIA, SINUS: ICD-10-CM

## 2025-03-27 DIAGNOSIS — Z95.5 STENTED CORONARY ARTERY: Primary | ICD-10-CM

## 2025-03-27 PROCEDURE — 99214 OFFICE O/P EST MOD 30 MIN: CPT | Performed by: INTERNAL MEDICINE

## 2025-03-27 PROCEDURE — 93798 PHYS/QHP OP CAR RHAB W/ECG: CPT

## 2025-03-27 RX ORDER — LISINOPRIL 5 MG/1
5 TABLET ORAL DAILY
Qty: 90 TABLET | Refills: 3 | Status: SHIPPED | OUTPATIENT
Start: 2025-03-27

## 2025-03-27 NOTE — PROGRESS NOTES
[facility]  [address], [address]  [place], [place], [address]    [phone]  [email or web address]           OUTPATIENT CLINIC FOLLOW UP NOTE    Patient care team:  Patient Care Team:  Lorena Camp PA as PCP - General (Family Medicine)  Daly Calle DO as Consulting Physician (Endocrinology)  Eduardo Sow MD as Consulting Physician (Cardiology)        Subjective:   Chief complaint:   Chief Complaint   Patient presents with    Chest Pain f/u     Pt denies chest pain today during triage         Otilia Cano is a 64 y.o. female.  Cardiac focused problem list:  CAD  Echocardiogram, 7/17/2024:  LVEF 56-60%. Mild AR. Mild MR.   GXT, 2/26/2025:  1 mm ST depressions noted in the inferior leads suggestive of ischemia. No inducible chest pain.   [facility], 3/14/2025:  95% mid circumflex stenosis status post KATHY x 1. 70% tubular mid LAD stenosis status post KATHY x 1.   Bradycardia   Stress test, 11/2015 without ischemia.   Tilt table test, 3/16/2016:  Negative tilt table test.   Prominent symptoms following COVID 2/2024, fatigue, dizziness, slower heart rate  PVCs, palpitations  30 day heart monitor, 6/16/2016:  No significant arrhythmias.   14 day heart monitor, 7/01/2024:  No significant arrhythmia. Patient triggered events associated with sinus rhythm, PACs, or very short runs of SVT.   Hyperlipidemia   Hypothyroidism     HPI:    Patient presents today in for follow up.      Doing well cardiac standpoint after stenting.  Denies chest pain.  Just started cardiac rehab.  Mild arm bruising improving      Review of Systems:  As noted above in the HPI    PFSH:  Patient Active Problem List   Diagnosis    Lymphocytic thyroiditis    Hypothyroidism    Hyperlipidemia    Palpitations    Lichen planus    Precordial chest pain         Current Outpatient Medications:     aspirin 81 MG EC tablet, Take 1 tablet by mouth Daily., Disp: , Rfl:     atorvastatin (LIPITOR) 40 MG  "tablet, Take 1 tablet by mouth Daily., Disp: , Rfl:     Cholecalciferol (VITAMIN D3) 2000 UNITS tablet, Take  by mouth., Disp: , Rfl:     clopidogrel (PLAVIX) 75 MG tablet, Take 1 tablet by mouth Daily., Disp: 90 tablet, Rfl: 3    cyanocobalamin 1000 MCG/ML injection, INJECT 1 ML SUBCUTANEOUSLY EVERY MONTH, Disp: , Rfl:     dexAMETHasone 0.5 MG/5ML solution, RINSE AND HOLD 5 MLS FOR 2 MINUTES THEN SPIT FOUR TIMES A DAY AS NEEDED, Disp: , Rfl:     estradiol (MINIVELLE) 0.05 MG/24HR patch, Place 1 patch on the skin as directed by provider Every 14 (Fourteen) Days., Disp: , Rfl:     levothyroxine (SYNTHROID, LEVOTHROID) 88 MCG tablet, Take 1 tablet by mouth Daily., Disp: 90 tablet, Rfl: 3    lisinopril (PRINIVIL,ZESTRIL) 5 MG tablet, Take 1 tablet by mouth Daily., Disp: , Rfl:     Allergies   Allergen Reactions    Codeine Nausea And Vomiting    Rosuvastatin Myalgia    Statins Myalgia       Social History     Socioeconomic History    Marital status:    Tobacco Use    Smoking status: Former     Current packs/day: 0.00     Types: Cigarettes     Quit date:      Years since quittin.2     Passive exposure: Past    Smokeless tobacco: Never    Tobacco comments:     I smoked socially when going out in my early twenties for a short time   Vaping Use    Vaping status: Never Used   Substance and Sexual Activity    Alcohol use: Yes     Alcohol/week: 2.0 standard drinks of alcohol     Types: 1 Glasses of wine, 1 Drinks containing 0.5 oz of alcohol per week     Comment: Not every week. Maybe every other week and on vacation    Drug use: Never    Sexual activity: Yes     Partners: Male     Birth control/protection: Hysterectomy     Comment: I am . We are monogamous with each other       Objective:   Physical Exam:  /68 (BP Location: Left arm, Patient Position: Sitting, Cuff Size: Adult)   Pulse 55   Ht 170.2 cm (67\")   Wt 80.6 kg (177 lb 9.6 oz)   LMP  (LMP Unknown)   SpO2 99%   BMI 27.82 kg/m² " "  CONSTITUTIONAL: No acute distress  CARDIOVASCULAR: Regular rate and rhythm with normal S1 and S2. Without murmur.  PERIPHERAL VASCULAR: No carotid bruit bilaterally.  Normal radial pulse.     Labs:  Labs reviewed by myself    Lab Results   Component Value Date    CHOL 140 03/14/2025     Lab Results   Component Value Date    TRIG 74 03/14/2025     Lab Results   Component Value Date    HDL 49 03/14/2025     Lab Results   Component Value Date    LDL 76 03/14/2025     No components found for: \"LDLDIRECTC\"    Diagnostic Data:    Procedures    Results for orders placed during the hospital encounter of 07/17/24    Adult Transthoracic Echo Complete w/ Color, Spectral and Contrast if necessary per protocol    Interpretation Summary    Left ventricular systolic function is normal. Left ventricular ejection fraction appears to be 56 - 60%.    Mild aortic valve regurgitation is present.    Mild mitral valve regurgitation is present.      Assessment and Plan:     CAD  -Recent cath with KATHY x 1 to 95 % mid circumflex stenosis and KATHY x 1 to 70% tubular mid LAD.    -Continue DAPT, statin  -Not on a beta blocker due to relative bradycardia    Essential hypertension  -Borderline BP on low-dose lisinopril.  Will have blood pressure monitored at cardiac rehab.  If averaging in the low 110s, would consider discontinuation of lisinopril    Bradycardia, sinus  -Continue clinical monitoring    Sleep disturbance   Hypothyroidism  -Has ongoing sleep disturbances, worse since COVID.  Patient considering a sleep medicine referral  -Thyroid function has been stable  -Trying magnesium glycinate    - Return in about 6 months (around 9/27/2025) for Next follow up with VEOR Butler, Next follow up with an ECG, if none in last year.      "

## 2025-03-28 ENCOUNTER — TREATMENT (OUTPATIENT)
Dept: CARDIAC REHAB | Facility: HOSPITAL | Age: 65
End: 2025-03-28
Payer: COMMERCIAL

## 2025-03-28 DIAGNOSIS — Z95.5 STENTED CORONARY ARTERY: Primary | ICD-10-CM

## 2025-03-28 PROCEDURE — 93798 PHYS/QHP OP CAR RHAB W/ECG: CPT

## 2025-03-31 ENCOUNTER — TREATMENT (OUTPATIENT)
Dept: CARDIAC REHAB | Facility: HOSPITAL | Age: 65
End: 2025-03-31
Payer: COMMERCIAL

## 2025-03-31 DIAGNOSIS — Z95.5 STENTED CORONARY ARTERY: Primary | ICD-10-CM

## 2025-03-31 PROCEDURE — 93798 PHYS/QHP OP CAR RHAB W/ECG: CPT

## 2025-04-01 ENCOUNTER — TELEPHONE (OUTPATIENT)
Dept: CARDIOLOGY | Facility: CLINIC | Age: 65
End: 2025-04-01
Payer: COMMERCIAL

## 2025-04-01 NOTE — TELEPHONE ENCOUNTER
Fax: [phone]    [facility] office called. Pt informed them of recent stent placement. They reported she is having a cleaning and may potentially need a filling. They denied thinking she would need to be off her blood thinner for any reason. I informed them she would not be able to stop for at least a few more months. Needing official cardiac clearance for cleaning/filling.    Please advise.

## 2025-04-02 ENCOUNTER — TREATMENT (OUTPATIENT)
Dept: CARDIAC REHAB | Facility: HOSPITAL | Age: 65
End: 2025-04-02
Payer: COMMERCIAL

## 2025-04-02 DIAGNOSIS — Z95.5 STENTED CORONARY ARTERY: Primary | ICD-10-CM

## 2025-04-02 PROCEDURE — 93798 PHYS/QHP OP CAR RHAB W/ECG: CPT

## 2025-04-02 NOTE — PROGRESS NOTES
"  NUTRITION ASSESSMENT & EDUCATION  CARDIAC/PULMONARY REHAB      Appointment Date and Time: 04/02/25, 15:16 EDT  Patient Name: Otilia Cano   Age: 64 y.o.     Sex:  female      Employment/Activity Level:   Otilia's education level: college  Occupation:  Artist/                          Job Activity Level: mild  Routine Exercise: strong. Previously fairly sedentary. Post stent- has been attending cardiac rehab and working out at home off days regularly                                    Weight Assessment:   Height:   Ht Readings from Last 1 Encounters:   03/27/25 170.2 cm (67\")     Weight:   Wt Readings from Last 1 Encounters:   03/27/25 80.6 kg (177 lb 9.6 oz)         BMI:    BMI Readings from Last 1 Encounters:   03/27/25 27.82 kg/m²       -------------------------------------------------------------------------------------------------  IBW: Ideal body weight: 61.6 kg (135 lb 12.9 oz)  Adjusted ideal body weight: 69.2 kg (152 lb 8.4 oz)  -------------------------------------------------------------------------------------------------  Weight Assessment: Overweight, acceptable for age  Weight Change last six months: loss of 10 lb recently, intentional       Usual Weight: 185 lb       Desired Weight: 160-170 lb acceptable    Cardiac Risk Factors:         Atherosclerotic heart disease       Stents       Hyperlipidemia/hypercholesterolemia       Hypertension       Hypothyroidism    Pertinent Lab Values:     Total Cholesterol   Date Value Ref Range Status   03/14/2025 140 0 - 200 mg/dL Final     HDL Cholesterol   Date Value Ref Range Status   03/14/2025 49 40 - 60 mg/dL Final     LDL Cholesterol    Date Value Ref Range Status   03/14/2025 76 0 - 100 mg/dL Final     LDL/HDL Ratio   Date Value Ref Range Status   03/14/2025 1.56  Final     Triglycerides   Date Value Ref Range Status   03/14/2025 74 0 - 150 mg/dL Final     Hemoglobin A1C   Date Value Ref Range Status   03/14/2025 5.70 (H) 4.80 - 5.60 % " Final     TSH   Date Value Ref Range Status   04/30/2024 0.840 0.270 - 4.200 uIU/mL Final     Glucose   Date Value Ref Range Status   03/14/2025 108 (H) 65 - 99 mg/dL Final     Labs reviewed with Pt    Pertinent Medications:   Nutritional Supplements: reviewed  Pertinent Nutrition-Related Medications: Reviewed - no changes recommended at this time.  Self Identified Problems or Concerns:   Have been trying to eat healthier. Daughter is very health conscious and influences.  Still have some unhealthy food habits. Lot of conflicting information about foods    Nutrition Influences:   Appetite: good            Taste/smell changes:  Yes  Factors limiting PO intake: none    Food records reviewed?: Yes, completed review of 'Rate Your Plate' score and tallies.  Extensive review and discussion of home diet today.    Otilia lives with:  Valentino Bingham receives lifestyle support from others at home?: No  Spouse/significant other present for diet instruction today?: No    Diet Assessment:   Diet Survey Score: pending completion at this time    Meal intake pattern:  3 meals/day  Dining out:  weekly eating out. [facility], [facility]               Fast food:  yes, when traveling    24 hour recall:   B cottage cheese with strawberries   L salad with sardines and crackers   D chicken, sweet potato, green beans                       Snacks: yogurt parfait with fruit and granola  Who does the grocery shopping:  self  Who does the cooking at home:  self                     Home diet review:  Current diet is high in Saturated Fat intake, much room for improvement, Moderate-acceptable Sodium intake, aware of sodium guidelines and strives to reduce intake, Strives for a Heart Healthy Diet, Has recently made positive changes toward AHA nutrition guidelines, Expressed motivation to make heart healthy changes in diet today, and Diet includes several of the AHA recommendations of olive oil, nuts, beans, fish/poultry,  minimal added sugars    Eating style is very healthy in some areas - strong on seafood, beans, fruit/vegs, non caloric beverages  Work needed on - less saturated fat in beef/pork/skin on poultry, more nuts, more fiber, no coconut oil/crisco/less butter and rolls, ??added sugar intake- to monitor    Motivation level toward diet compliance:  strong  Assessment / Recommendations:   Prior diet education before to coming to Cardiac Rehab?: No  Instructed provided on:  American Heart Association 2021 Nutrition Guidelines, Saturated Fat, Picking Healthy Proteins, Get the Scoop on Sodium/Salt <2300 mg/d, Added Sugars Guidance, Go Nuts for Heart Health, Drift 3 fats in Fish & Seafood, and Dining Out Doesn't Mean Ditch Your Diet  Written materials provided to patient: Yes    Goals/Plan:   Patient defined goals:   1) Cut back on cheese and saturated fat meats  2) Increase fruits/vegetables to five servings daily  3) Add nuts/reid/flax to four times/week  4) Add more water with increases in fiber    Plan:  Encouraged to complete goals and continue setting new nutrition/exercise goals             Encouraged to follow up with dietitian during cardiac rehab sessions; may request additional RD counseling.    Lydia Cade RD, 15:16 EDT - 4/2/2025

## 2025-04-04 ENCOUNTER — TREATMENT (OUTPATIENT)
Dept: CARDIAC REHAB | Facility: HOSPITAL | Age: 65
End: 2025-04-04
Payer: COMMERCIAL

## 2025-04-04 DIAGNOSIS — Z95.5 STENTED CORONARY ARTERY: Primary | ICD-10-CM

## 2025-04-04 PROCEDURE — 93798 PHYS/QHP OP CAR RHAB W/ECG: CPT

## 2025-04-07 ENCOUNTER — TREATMENT (OUTPATIENT)
Dept: CARDIAC REHAB | Facility: HOSPITAL | Age: 65
End: 2025-04-07
Payer: COMMERCIAL

## 2025-04-07 DIAGNOSIS — Z95.5 STENTED CORONARY ARTERY: Primary | ICD-10-CM

## 2025-04-07 PROCEDURE — 93798 PHYS/QHP OP CAR RHAB W/ECG: CPT

## 2025-04-09 ENCOUNTER — TREATMENT (OUTPATIENT)
Dept: CARDIAC REHAB | Facility: HOSPITAL | Age: 65
End: 2025-04-09
Payer: COMMERCIAL

## 2025-04-09 DIAGNOSIS — Z95.5 STENTED CORONARY ARTERY: Primary | ICD-10-CM

## 2025-04-09 PROCEDURE — 93798 PHYS/QHP OP CAR RHAB W/ECG: CPT

## 2025-04-09 PROCEDURE — 93797 PHYS/QHP OP CAR RHAB WO ECG: CPT

## 2025-04-09 NOTE — PROGRESS NOTES
Attended Phase II Cardiac Rehab. No medication or health history changes reported. See [facility] for details.    RD Consult #8   Exercise Session #9   Total time: >90 minutes

## 2025-04-11 ENCOUNTER — APPOINTMENT (OUTPATIENT)
Dept: CARDIAC REHAB | Facility: HOSPITAL | Age: 65
End: 2025-04-11
Payer: COMMERCIAL

## 2025-04-14 ENCOUNTER — TREATMENT (OUTPATIENT)
Dept: CARDIAC REHAB | Facility: HOSPITAL | Age: 65
End: 2025-04-14
Payer: COMMERCIAL

## 2025-04-14 DIAGNOSIS — Z95.5 STENTED CORONARY ARTERY: Primary | ICD-10-CM

## 2025-04-14 PROCEDURE — 93798 PHYS/QHP OP CAR RHAB W/ECG: CPT

## 2025-04-16 ENCOUNTER — TREATMENT (OUTPATIENT)
Dept: CARDIAC REHAB | Facility: HOSPITAL | Age: 65
End: 2025-04-16
Payer: COMMERCIAL

## 2025-04-16 DIAGNOSIS — Z95.5 STENTED CORONARY ARTERY: Primary | ICD-10-CM

## 2025-04-16 PROCEDURE — 93798 PHYS/QHP OP CAR RHAB W/ECG: CPT

## 2025-04-18 ENCOUNTER — TREATMENT (OUTPATIENT)
Dept: CARDIAC REHAB | Facility: HOSPITAL | Age: 65
End: 2025-04-18
Payer: COMMERCIAL

## 2025-04-18 ENCOUNTER — TELEPHONE (OUTPATIENT)
Dept: ENDOCRINOLOGY | Facility: CLINIC | Age: 65
End: 2025-04-18
Payer: COMMERCIAL

## 2025-04-18 DIAGNOSIS — Z95.5 STENTED CORONARY ARTERY: Primary | ICD-10-CM

## 2025-04-18 PROCEDURE — 93798 PHYS/QHP OP CAR RHAB W/ECG: CPT

## 2025-04-18 NOTE — TELEPHONE ENCOUNTER
PATIENT NEEDS TO KNOW IF PATIENT NEEDS TO HAVE LABS DRAWN. PHONE NUMBER [phone]. SHE HAS AN APPOINTMENT ON 5/5/25 WITH DR. MENDOZA. SHE THOUGHT SHE WAS SUPPOSE TO COME BACK AND HAVE LABS DRAWN.

## 2025-04-28 ENCOUNTER — TREATMENT (OUTPATIENT)
Dept: CARDIAC REHAB | Facility: HOSPITAL | Age: 65
End: 2025-04-28
Payer: COMMERCIAL

## 2025-04-28 DIAGNOSIS — Z95.5 STENTED CORONARY ARTERY: Primary | ICD-10-CM

## 2025-04-28 PROCEDURE — 93798 PHYS/QHP OP CAR RHAB W/ECG: CPT

## 2025-04-30 ENCOUNTER — TREATMENT (OUTPATIENT)
Dept: CARDIAC REHAB | Facility: HOSPITAL | Age: 65
End: 2025-04-30
Payer: COMMERCIAL

## 2025-04-30 DIAGNOSIS — Z95.5 STENTED CORONARY ARTERY: Primary | ICD-10-CM

## 2025-04-30 PROCEDURE — 93798 PHYS/QHP OP CAR RHAB W/ECG: CPT

## 2025-05-01 ENCOUNTER — LAB (OUTPATIENT)
Dept: ENDOCRINOLOGY | Facility: CLINIC | Age: 65
End: 2025-05-01
Payer: COMMERCIAL

## 2025-05-01 DIAGNOSIS — E06.3 HYPOTHYROIDISM DUE TO HASHIMOTO'S THYROIDITIS: ICD-10-CM

## 2025-05-01 LAB
T4 FREE SERPL-MCNC: 1.4 NG/DL (ref 0.92–1.68)
TSH SERPL DL<=0.05 MIU/L-ACNC: 0.35 UIU/ML (ref 0.27–4.2)

## 2025-05-01 PROCEDURE — 36415 COLL VENOUS BLD VENIPUNCTURE: CPT | Performed by: INTERNAL MEDICINE

## 2025-05-01 PROCEDURE — 84439 ASSAY OF FREE THYROXINE: CPT | Performed by: INTERNAL MEDICINE

## 2025-05-01 PROCEDURE — 84443 ASSAY THYROID STIM HORMONE: CPT | Performed by: INTERNAL MEDICINE

## 2025-05-02 ENCOUNTER — TREATMENT (OUTPATIENT)
Dept: CARDIAC REHAB | Facility: HOSPITAL | Age: 65
End: 2025-05-02
Payer: COMMERCIAL

## 2025-05-02 DIAGNOSIS — Z95.5 STENTED CORONARY ARTERY: Primary | ICD-10-CM

## 2025-05-02 PROCEDURE — 93798 PHYS/QHP OP CAR RHAB W/ECG: CPT

## 2025-05-05 ENCOUNTER — TREATMENT (OUTPATIENT)
Dept: CARDIAC REHAB | Facility: HOSPITAL | Age: 65
End: 2025-05-05
Payer: COMMERCIAL

## 2025-05-05 DIAGNOSIS — Z95.5 STENTED CORONARY ARTERY: Primary | ICD-10-CM

## 2025-05-05 PROCEDURE — 93798 PHYS/QHP OP CAR RHAB W/ECG: CPT

## 2025-05-06 ENCOUNTER — OFFICE VISIT (OUTPATIENT)
Dept: ENDOCRINOLOGY | Facility: CLINIC | Age: 65
End: 2025-05-06
Payer: COMMERCIAL

## 2025-05-06 VITALS
HEIGHT: 67 IN | WEIGHT: 174 LBS | HEART RATE: 72 BPM | SYSTOLIC BLOOD PRESSURE: 120 MMHG | BODY MASS INDEX: 27.31 KG/M2 | OXYGEN SATURATION: 98 % | DIASTOLIC BLOOD PRESSURE: 64 MMHG

## 2025-05-06 DIAGNOSIS — E06.3 HYPOTHYROIDISM DUE TO HASHIMOTO'S THYROIDITIS: Primary | ICD-10-CM

## 2025-05-06 DIAGNOSIS — E55.9 VITAMIN D DEFICIENCY: ICD-10-CM

## 2025-05-06 DIAGNOSIS — E04.2 MULTIPLE THYROID NODULES: ICD-10-CM

## 2025-05-06 RX ORDER — LEVOTHYROXINE SODIUM 75 UG/1
75 TABLET ORAL DAILY
Qty: 30 TABLET | Refills: 5 | Status: SHIPPED | OUTPATIENT
Start: 2025-05-06

## 2025-05-06 NOTE — PROGRESS NOTES
"Chief Complaint   Patient presents with    Hypothyroidism    Hashimoto's Thyroiditis        HPI   Otilia Cano is a 64 y.o. female had concerns including Hypothyroidism and Hashimoto's Thyroiditis.      On levothyroxine 88 mcg daily and recent TFTs are in a normal range. TSH low normal. No s/s of hyperthyroidism.     She had two stents placed since her last visit here. Is feeling MUCH better.     Is in cardiac rehab. Weight is trending down.     The following portions of the patient's history were reviewed and updated as appropriate: allergies, current medications, past family history, past medical history, past social history, past surgical history, and problem list.    Review of Systems   Constitutional:  Negative for chills, diaphoresis, fatigue and fever.   HENT:  Negative for congestion, sore throat and swollen glands.    Respiratory:  Negative for cough.    Cardiovascular:  Negative for chest pain.   Gastrointestinal:  Negative for abdominal pain, nausea and vomiting.   Genitourinary:  Negative for dysuria.   Musculoskeletal:  Negative for myalgias and neck pain.   Skin:  Negative for rash.   Neurological:  Negative for weakness and numbness.        /64 (BP Location: Left arm, Patient Position: Sitting, Cuff Size: Adult)   Pulse 72   Ht 170.2 cm (67\")   Wt 78.9 kg (174 lb)   LMP  (LMP Unknown)   SpO2 98%   BMI 27.25 kg/m²      Physical Exam  Vitals reviewed.   Constitutional:       Appearance: Normal appearance. She is obese.   Cardiovascular:      Rate and Rhythm: Normal rate.   Pulmonary:      Effort: Pulmonary effort is normal.   Neurological:      General: No focal deficit present.      Mental Status: She is alert. Mental status is at baseline.   Psychiatric:         Mood and Affect: Mood normal.         Behavior: Behavior normal.              LABS AND IMAGING   CMP  Lab Results   Component Value Date    GLUCOSE 108 (H) 03/14/2025    BUN 14 03/14/2025    CREATININE 0.77 03/14/2025    " BCR 18.2 2025    K 4.2 2025    CO2 22.0 2025    CALCIUM 8.9 2025    ALBUMIN 4.1 2025    AST 18 2025    ALT 12 2025        CBC w/DIFF   Lab Results   Component Value Date    WBC 12.60 (H) 2025    RBC 4.56 2025    HGB 14.0 2025    HCT 41.3 2025    MCV 90.6 2025    MCH 30.7 2025    MCHC 33.9 2025    RDW 12.6 2025    RDWSD 40.5 2025    MPV 11.2 2025     2025    NEUTRORELPCT 77.0 (H) 2025    LYMPHORELPCT 13.7 (L) 2025    MONORELPCT 5.5 2025    EOSRELPCT 3.0 2025    BASORELPCT 0.6 2025    AUTOIGPER 0.2 2025    NEUTROABS 9.71 (H) 2025    LYMPHSABS 1.72 2025    MONOSABS 0.69 2025    EOSABS 0.38 2025    BASOSABS 0.08 2025    AUTOIGNUM 0.02 2025    NRBC 0.0 2025     TSH  Lab Results   Component Value Date    TSH 0.349 2025    TSH 0.840 2024    TSH 2.140 2023     T4  Lab Results   Component Value Date    FREET4 1.40 2025    FREET4 1.32 2024    FREET4 1.17 2023     T3  Lab Results   Component Value Date    T3FREE 3.1 2016       Lab Results   Component Value Date    CUZQ44BM 36.5 2024    MSQN81NK 15.5 2016 thyroid ultrasound with right lobe 4.1 x 1.6 x 1.2 cm, left lobe 4.2 x 1.2 x 1.1 cm, gland is normal and heterogeneous in echotexture, complex left nodule measuring 10 x 7 x 10 mm, hypoechoic left nodule measuring 7 x 5 x 7 mm, hyperechoic left solid lesion measuring 6 x 4 x 5 mm, recommended 1 year follow-up       Thyroid Ultrasound 25    Indication: thyroid nodules  Comparison Imagin2024  Clinical History: thyroid nodules, no FNA. Hypothyroidism    Real time high resolution imaging of the thyroid gland was performed in transverse and longitudinal planes. Previous imaging reports were reviewed if available and compared to the current to assess stability.  "    Lobes:  The right lobe measured 4.0 cm L x 1.1 cm AP x 1.3 cm in TV dimension.    The isthmus measured 0.2 cm in thickness.    The left thyroid lobe measured 4.0 cm L x 0.8 cm AP x 1.0 cm in TV dimension.    Thyroid gland is diminutive and diffusely heterogeneous and contains left lobe nodules.    Nodule 1   located in the left mid lobe and measures 0.7 x 0.5 x 0.6 cm (L X AP X TV).  This nodule is mixed solid and cystic, heterogeneous, hypoechoic with well-defined margins, and Grade I vascularity on Color Flow Doppler.  It has no artifacts.     Subcentimeter hyperechoic nodules are noted in the left lobe, consistent with a \"[place]\" nodule seen with autoimmune thyroid disease/Hashimoto's.     No pathologic lymph nodes were seen.    Impression:  Diminutive and diffusely heterogeneous gland consistent with autoimmune thyroid disease.  Stable subcentimeter left lobe nodules noted.  No suspicious features.    Recommendation:  No routine ultrasound monitoring is necessary.      Assessment and Plan    Diagnoses and all orders for this visit:    1. Hypothyroidism due to Hashimoto's thyroiditis (Primary)  TSH in lowest range of normal on 88 mcg daily. Will decrease to 75 mcg daily. Repeat labs in 2-3 months.   -     levothyroxine (SYNTHROID, LEVOTHROID) 75 MCG tablet; Take 1 tablet by mouth Daily.  Dispense: 30 tablet; Refill: 5  -     T4, Free; Future  -     TSH; Future    2. Multiple thyroid nodules  US updated today.   Diminutive and diffusely heterogeneous gland consistent with autoimmune thyroid disease.  Stable subcentimeter left lobe nodules noted.  No suspicious features.  No routine ultrasound monitoring is necessary.  -     US Thyroid    3. Vitamin D deficiency  Not currently taking vitamin D supplement.  Recheck with next labs.    Return in about 6 months (around 11/6/2025) for next scheduled follow up. The patient was instructed to contact the clinic with any interval questions or " concerns.    Electronically signed by: Daly Calle DO   Endocrinologist    Please note that portions of this note were completed with a voice recognition program.

## 2025-05-07 ENCOUNTER — TREATMENT (OUTPATIENT)
Dept: CARDIAC REHAB | Facility: HOSPITAL | Age: 65
End: 2025-05-07
Payer: COMMERCIAL

## 2025-05-07 DIAGNOSIS — Z95.5 STENTED CORONARY ARTERY: Primary | ICD-10-CM

## 2025-05-07 PROCEDURE — 93798 PHYS/QHP OP CAR RHAB W/ECG: CPT

## 2025-05-09 ENCOUNTER — TREATMENT (OUTPATIENT)
Dept: CARDIAC REHAB | Facility: HOSPITAL | Age: 65
End: 2025-05-09
Payer: COMMERCIAL

## 2025-05-09 DIAGNOSIS — Z95.5 STENTED CORONARY ARTERY: Primary | ICD-10-CM

## 2025-05-09 PROCEDURE — 93798 PHYS/QHP OP CAR RHAB W/ECG: CPT

## 2025-05-12 ENCOUNTER — TREATMENT (OUTPATIENT)
Dept: CARDIAC REHAB | Facility: HOSPITAL | Age: 65
End: 2025-05-12
Payer: COMMERCIAL

## 2025-05-12 DIAGNOSIS — Z95.5 STENTED CORONARY ARTERY: Primary | ICD-10-CM

## 2025-05-12 PROCEDURE — 93798 PHYS/QHP OP CAR RHAB W/ECG: CPT

## 2025-05-14 ENCOUNTER — APPOINTMENT (OUTPATIENT)
Dept: CARDIAC REHAB | Facility: HOSPITAL | Age: 65
End: 2025-05-14
Payer: COMMERCIAL

## 2025-05-16 ENCOUNTER — TREATMENT (OUTPATIENT)
Dept: CARDIAC REHAB | Facility: HOSPITAL | Age: 65
End: 2025-05-16
Payer: COMMERCIAL

## 2025-05-16 DIAGNOSIS — Z95.5 STENTED CORONARY ARTERY: Primary | ICD-10-CM

## 2025-05-16 PROCEDURE — 93798 PHYS/QHP OP CAR RHAB W/ECG: CPT

## 2025-05-21 ENCOUNTER — TREATMENT (OUTPATIENT)
Dept: CARDIAC REHAB | Facility: HOSPITAL | Age: 65
End: 2025-05-21
Payer: COMMERCIAL

## 2025-05-21 DIAGNOSIS — Z95.5 STENTED CORONARY ARTERY: Primary | ICD-10-CM

## 2025-05-21 PROCEDURE — 93798 PHYS/QHP OP CAR RHAB W/ECG: CPT

## 2025-05-23 ENCOUNTER — TREATMENT (OUTPATIENT)
Dept: CARDIAC REHAB | Facility: HOSPITAL | Age: 65
End: 2025-05-23
Payer: COMMERCIAL

## 2025-05-23 DIAGNOSIS — Z95.5 STENTED CORONARY ARTERY: Primary | ICD-10-CM

## 2025-05-23 PROCEDURE — 93798 PHYS/QHP OP CAR RHAB W/ECG: CPT

## 2025-05-28 ENCOUNTER — TREATMENT (OUTPATIENT)
Dept: CARDIAC REHAB | Facility: HOSPITAL | Age: 65
End: 2025-05-28
Payer: COMMERCIAL

## 2025-05-28 DIAGNOSIS — Z95.5 STENTED CORONARY ARTERY: Primary | ICD-10-CM

## 2025-05-28 PROCEDURE — 93798 PHYS/QHP OP CAR RHAB W/ECG: CPT

## 2025-05-30 ENCOUNTER — TREATMENT (OUTPATIENT)
Dept: CARDIAC REHAB | Facility: HOSPITAL | Age: 65
End: 2025-05-30
Payer: COMMERCIAL

## 2025-05-30 DIAGNOSIS — Z95.5 STENTED CORONARY ARTERY: Primary | ICD-10-CM

## 2025-05-30 PROCEDURE — 93798 PHYS/QHP OP CAR RHAB W/ECG: CPT

## 2025-06-02 ENCOUNTER — TREATMENT (OUTPATIENT)
Dept: CARDIAC REHAB | Facility: HOSPITAL | Age: 65
End: 2025-06-02
Payer: COMMERCIAL

## 2025-06-02 DIAGNOSIS — Z95.5 STENTED CORONARY ARTERY: Primary | ICD-10-CM

## 2025-06-02 PROCEDURE — 93798 PHYS/QHP OP CAR RHAB W/ECG: CPT

## 2025-06-04 ENCOUNTER — TREATMENT (OUTPATIENT)
Dept: CARDIAC REHAB | Facility: HOSPITAL | Age: 65
End: 2025-06-04
Payer: COMMERCIAL

## 2025-06-04 DIAGNOSIS — Z95.5 STENTED CORONARY ARTERY: Primary | ICD-10-CM

## 2025-06-04 PROCEDURE — 93798 PHYS/QHP OP CAR RHAB W/ECG: CPT

## 2025-06-06 ENCOUNTER — TREATMENT (OUTPATIENT)
Dept: CARDIAC REHAB | Facility: HOSPITAL | Age: 65
End: 2025-06-06
Payer: COMMERCIAL

## 2025-06-06 DIAGNOSIS — Z95.5 STENTED CORONARY ARTERY: Primary | ICD-10-CM

## 2025-06-06 PROCEDURE — 93798 PHYS/QHP OP CAR RHAB W/ECG: CPT

## 2025-06-09 ENCOUNTER — TREATMENT (OUTPATIENT)
Dept: CARDIAC REHAB | Facility: HOSPITAL | Age: 65
End: 2025-06-09
Payer: COMMERCIAL

## 2025-06-09 DIAGNOSIS — Z95.5 STENTED CORONARY ARTERY: Primary | ICD-10-CM

## 2025-06-09 PROCEDURE — 93798 PHYS/QHP OP CAR RHAB W/ECG: CPT

## 2025-06-11 ENCOUNTER — TREATMENT (OUTPATIENT)
Dept: CARDIAC REHAB | Facility: HOSPITAL | Age: 65
End: 2025-06-11
Payer: COMMERCIAL

## 2025-06-11 DIAGNOSIS — Z95.5 STENTED CORONARY ARTERY: Primary | ICD-10-CM

## 2025-06-11 PROCEDURE — 93798 PHYS/QHP OP CAR RHAB W/ECG: CPT

## 2025-06-13 ENCOUNTER — TREATMENT (OUTPATIENT)
Dept: CARDIAC REHAB | Facility: HOSPITAL | Age: 65
End: 2025-06-13
Payer: COMMERCIAL

## 2025-06-13 DIAGNOSIS — Z95.5 STENTED CORONARY ARTERY: Primary | ICD-10-CM

## 2025-06-13 PROCEDURE — 93798 PHYS/QHP OP CAR RHAB W/ECG: CPT

## 2025-06-16 ENCOUNTER — TREATMENT (OUTPATIENT)
Dept: CARDIAC REHAB | Facility: HOSPITAL | Age: 65
End: 2025-06-16
Payer: COMMERCIAL

## 2025-06-16 DIAGNOSIS — Z95.5 STENTED CORONARY ARTERY: Primary | ICD-10-CM

## 2025-06-16 PROCEDURE — 93798 PHYS/QHP OP CAR RHAB W/ECG: CPT

## 2025-06-18 ENCOUNTER — TREATMENT (OUTPATIENT)
Dept: CARDIAC REHAB | Facility: HOSPITAL | Age: 65
End: 2025-06-18
Payer: COMMERCIAL

## 2025-06-18 DIAGNOSIS — Z95.5 STENTED CORONARY ARTERY: Primary | ICD-10-CM

## 2025-06-18 PROCEDURE — 93798 PHYS/QHP OP CAR RHAB W/ECG: CPT

## 2025-06-20 ENCOUNTER — TREATMENT (OUTPATIENT)
Dept: CARDIAC REHAB | Facility: HOSPITAL | Age: 65
End: 2025-06-20
Payer: COMMERCIAL

## 2025-06-20 DIAGNOSIS — Z95.5 STENTED CORONARY ARTERY: Primary | ICD-10-CM

## 2025-06-20 PROCEDURE — 93798 PHYS/QHP OP CAR RHAB W/ECG: CPT

## 2025-06-23 ENCOUNTER — TREATMENT (OUTPATIENT)
Dept: CARDIAC REHAB | Facility: HOSPITAL | Age: 65
End: 2025-06-23
Payer: COMMERCIAL

## 2025-06-23 DIAGNOSIS — Z95.5 STENTED CORONARY ARTERY: Primary | ICD-10-CM

## 2025-06-23 PROCEDURE — 93798 PHYS/QHP OP CAR RHAB W/ECG: CPT

## 2025-06-25 ENCOUNTER — TREATMENT (OUTPATIENT)
Dept: CARDIAC REHAB | Facility: HOSPITAL | Age: 65
End: 2025-06-25
Payer: COMMERCIAL

## 2025-06-25 DIAGNOSIS — Z95.5 STENTED CORONARY ARTERY: Primary | ICD-10-CM

## 2025-06-25 PROCEDURE — 93798 PHYS/QHP OP CAR RHAB W/ECG: CPT

## 2025-06-27 ENCOUNTER — TELEPHONE (OUTPATIENT)
Dept: CARDIAC REHAB | Facility: HOSPITAL | Age: 65
End: 2025-06-27
Payer: COMMERCIAL

## 2025-06-27 NOTE — TELEPHONE ENCOUNTER
Patient calls in to cancel her Phase II session for today, due to a sore throat. She will be on vacation next week so will return July 7, 2025 to graduate.

## 2025-07-09 ENCOUNTER — TREATMENT (OUTPATIENT)
Dept: CARDIAC REHAB | Facility: HOSPITAL | Age: 65
End: 2025-07-09
Payer: COMMERCIAL

## 2025-07-09 DIAGNOSIS — Z95.5 STENTED CORONARY ARTERY: Primary | ICD-10-CM

## 2025-07-09 PROCEDURE — 93798 PHYS/QHP OP CAR RHAB W/ECG: CPT

## 2025-07-09 NOTE — PROGRESS NOTES
Attended Phase II Cardiac Rehab. No medication or health history changes reported. See [facility] for details.    Patient graduates with her 36th session.

## 2025-07-12 DIAGNOSIS — E06.3 HYPOTHYROIDISM DUE TO HASHIMOTO'S THYROIDITIS: ICD-10-CM

## 2025-07-14 RX ORDER — LEVOTHYROXINE SODIUM 88 UG/1
88 TABLET ORAL DAILY
Qty: 90 TABLET | Refills: 3 | OUTPATIENT
Start: 2025-07-14

## 2025-07-18 ENCOUNTER — LAB (OUTPATIENT)
Dept: ENDOCRINOLOGY | Facility: CLINIC | Age: 65
End: 2025-07-18
Payer: COMMERCIAL

## 2025-07-18 DIAGNOSIS — E06.3 HYPOTHYROIDISM DUE TO HASHIMOTO'S THYROIDITIS: ICD-10-CM

## 2025-07-18 DIAGNOSIS — E55.9 VITAMIN D DEFICIENCY: ICD-10-CM

## 2025-07-18 LAB
25(OH)D3 SERPL-MCNC: 40.7 NG/ML (ref 30–100)
T4 FREE SERPL-MCNC: 1.34 NG/DL (ref 0.92–1.68)
TSH SERPL DL<=0.05 MIU/L-ACNC: 1.8 UIU/ML (ref 0.27–4.2)

## 2025-07-18 PROCEDURE — 36415 COLL VENOUS BLD VENIPUNCTURE: CPT | Performed by: INTERNAL MEDICINE

## 2025-07-18 PROCEDURE — 82306 VITAMIN D 25 HYDROXY: CPT | Performed by: INTERNAL MEDICINE

## 2025-07-18 PROCEDURE — 84439 ASSAY OF FREE THYROXINE: CPT | Performed by: INTERNAL MEDICINE

## 2025-07-18 PROCEDURE — 84443 ASSAY THYROID STIM HORMONE: CPT | Performed by: INTERNAL MEDICINE

## 2025-08-05 ENCOUNTER — TREATMENT (OUTPATIENT)
Dept: CARDIAC REHAB | Facility: HOSPITAL | Age: 65
End: 2025-08-05

## 2025-08-05 ENCOUNTER — TRANSCRIBE ORDERS (OUTPATIENT)
Dept: CARDIAC REHAB | Facility: HOSPITAL | Age: 65
End: 2025-08-05
Payer: COMMERCIAL

## 2025-08-05 DIAGNOSIS — Z00.00 PREVENTATIVE HEALTH CARE: Primary | ICD-10-CM

## 2025-08-05 DIAGNOSIS — Z95.5 STENTED CORONARY ARTERY: Primary | ICD-10-CM

## 2025-08-07 ENCOUNTER — TREATMENT (OUTPATIENT)
Dept: CARDIAC REHAB | Facility: HOSPITAL | Age: 65
End: 2025-08-07

## 2025-08-07 DIAGNOSIS — Z00.00 PREVENTATIVE HEALTH CARE: Primary | ICD-10-CM

## 2025-08-12 ENCOUNTER — TREATMENT (OUTPATIENT)
Dept: CARDIAC REHAB | Facility: HOSPITAL | Age: 65
End: 2025-08-12

## 2025-08-12 DIAGNOSIS — Z00.00 PREVENTATIVE HEALTH CARE: ICD-10-CM

## 2025-08-14 ENCOUNTER — TREATMENT (OUTPATIENT)
Dept: CARDIAC REHAB | Facility: HOSPITAL | Age: 65
End: 2025-08-14

## 2025-08-14 DIAGNOSIS — Z00.00 PREVENTATIVE HEALTH CARE: Primary | ICD-10-CM

## 2025-08-19 ENCOUNTER — TREATMENT (OUTPATIENT)
Dept: CARDIAC REHAB | Facility: HOSPITAL | Age: 65
End: 2025-08-19

## 2025-08-19 DIAGNOSIS — Z00.00 PREVENTATIVE HEALTH CARE: Primary | ICD-10-CM

## 2025-08-21 ENCOUNTER — TREATMENT (OUTPATIENT)
Dept: CARDIAC REHAB | Facility: HOSPITAL | Age: 65
End: 2025-08-21

## 2025-08-21 DIAGNOSIS — Z00.00 PREVENTATIVE HEALTH CARE: Primary | ICD-10-CM

## 2025-08-26 ENCOUNTER — TREATMENT (OUTPATIENT)
Dept: CARDIAC REHAB | Facility: HOSPITAL | Age: 65
End: 2025-08-26

## 2025-08-26 DIAGNOSIS — Z00.00 PREVENTATIVE HEALTH CARE: Primary | ICD-10-CM

## 2025-08-28 ENCOUNTER — TREATMENT (OUTPATIENT)
Dept: CARDIAC REHAB | Facility: HOSPITAL | Age: 65
End: 2025-08-28

## 2025-08-28 DIAGNOSIS — Z00.00 PREVENTATIVE HEALTH CARE: Primary | ICD-10-CM

## (undated) DEVICE — KT CATH IMG DRAGONFLY/OPSTAR 2.7F 135CM

## (undated) DEVICE — TREK CORONARY DILATATION CATHETER 2.50 MM X 15 MM / RAPID-EXCHANGE: Brand: TREK

## (undated) DEVICE — CATH DIAG EXPO .045 FL3.5 5F 100CM

## (undated) DEVICE — GW PERIPH GUIDERIGHT STD/EXCHNG/J/TIP SS 0.035IN 5X260CM

## (undated) DEVICE — TR BAND RADIAL ARTERY COMPRESSION DEVICE: Brand: TR BAND

## (undated) DEVICE — ADULT, W/LG. BACK PAD, RADIOTRANSPARENT ELEMENT AND LEAD WIRE COMPATIBLE W/: Brand: DEFIBRILLATION ELECTRODES

## (undated) DEVICE — MODEL BT2000 P/N 700287-012KIT CONTENTS: MANIFOLD WITH SALINE AND CONTRAST PORTS, SALINE TUBING WITH SPIKE AND HAND SYRINGE, TRANSDUCER: Brand: BT2000 AUTOMATED MANIFOLD KIT

## (undated) DEVICE — NC TREK NEO™ CORONARY DILATATION CATHETER 2.75 MM X 12 MM / RAPID-EXCHANGE: Brand: NC TREK NEO™

## (undated) DEVICE — Device: Brand: ASAHI SION BLUE

## (undated) DEVICE — MODEL AT P65, P/N 701554-001KIT CONTENTS: HAND CONTROLLER, 3-WAY HIGH-PRESSURE STOPCOCK WITH ROTATING END AND PREMIUM HIGH-PRESSURE TUBING: Brand: ANGIOTOUCH® KIT

## (undated) DEVICE — GLIDESHEATH SLENDER STAINLESS STEEL KIT: Brand: GLIDESHEATH SLENDER

## (undated) DEVICE — NC TREK NEO™ CORONARY DILATATION CATHETER 3.00 MM X 12 MM / RAPID-EXCHANGE: Brand: NC TREK NEO™

## (undated) DEVICE — GUIDE CATHETER: Brand: MACH1™

## (undated) DEVICE — PK CATH CARD 10

## (undated) DEVICE — CATH DIAG EXPO M/ PK 5F FL4/FR4 PIG

## (undated) DEVICE — DEV INFL MONARCH 25W